# Patient Record
Sex: MALE | Race: WHITE | Employment: FULL TIME | URBAN - METROPOLITAN AREA
[De-identification: names, ages, dates, MRNs, and addresses within clinical notes are randomized per-mention and may not be internally consistent; named-entity substitution may affect disease eponyms.]

---

## 2017-04-10 DIAGNOSIS — E11.40 TYPE 2 DIABETES MELLITUS WITH DIABETIC NEUROPATHY (H): ICD-10-CM

## 2017-04-10 NOTE — PROGRESS NOTES
"  SUBJECTIVE:                                                    Chung Lincoln is a 56 year old male who presents to clinic today for the following health issues:      HPI    Diabetes Follow-up    Patient is checking blood sugars: once daily.  Results are as follows:         120s usually. Doesn't check it that often. If he does check it's usually in the morning before eating     Diabetic concerns: None     Symptoms of hypoglycemia (low blood sugar): Symptoms occur if sugars < 80.     Paresthesias (numbness or burning in feet) or sores: Yes numbness, tingling and pain in both feet      Date of last diabetic eye exam: is due for one now        Has lost 20 pounds since last visit as he is working on a healthier diet and exercise. Numbness and tingling in feet is stable and worse when he is eating poorly.     Problem list and histories reviewed & adjusted, as indicated.  Additional history: none    ROS:  GENERAL: Denies fever, fatigue, weakness, weight gain, or weight loss.  CARDIOVASCULAR: Denies chest pain, shortness of breath, irregular heartbeats,  palpitations, or edema.  RESPIRATORY: Denies cough, hemoptysis, and shortness of breath.  NEUROLOGIC: Denies headache, fainting, dizziness, memory loss, numbness, tingling, or seizures.    OBJECTIVE:                                                    /78 (BP Location: Right arm, Patient Position: Chair, Cuff Size: Adult Large)  Pulse 68  Temp 97.7  F (36.5  C) (Oral)  Resp 18  Ht 5' 8.11\" (1.73 m)  Wt 278 lb 3.2 oz (126.2 kg)  SpO2 97%  BMI 42.16 kg/m2  Body mass index is 42.16 kg/(m^2).  GENERAL: healthy, alert and no distress  RESP: lungs clear to auscultation - no rales, rhonchi or wheezes  CV: regular rate and rhythm, normal S1 S2, no S3 or S4, no murmur, click or rub, no peripheral edema    Diagnostic Test Results:  Lab Results   Component Value Date    A1C 6.2 04/14/2017    A1C 6.4 12/16/2016    A1C 6.9 09/12/2016    A1C 7.9 05/23/2016    A1C 7.4 " 03/25/2009          ASSESSMENT/PLAN:                                                        ICD-10-CM    1. Type 2 diabetes mellitus without complication, without long-term current use of insulin (H) E11.9 Hemoglobin A1c   2. Essential hypertension with goal blood pressure less than 140/90 I10    3. Screen for colon cancer Z12.11        Diabetes is under great control. Continue with your current diet and exercise regimen and follow up in 6 months.     a FIT test.    Toy Martinez PA-C  [facility]

## 2017-04-10 NOTE — TELEPHONE ENCOUNTER
[facility] phone call message- patient requests medication or medication refill:    If this is a refill request, has the caller requested the refill from the pharmacy already? No  Name of the pharmacy and phone number for the current request:  [facility] [phone]    Name of the medication requested: Metformin    Other request: patient will be out today but he has a appt set up for Friday    OK to leave the result message on voice mail or with a family member? NO    Call taken on 4/10/2017 at 11:41 AM by Tanesha Bellamy

## 2017-04-10 NOTE — TELEPHONE ENCOUNTER
Medication is being filled for 1 time refill only due to:  Patient needs to be seen because due for OV.     Josie Tejada, RN, BSN

## 2017-04-10 NOTE — TELEPHONE ENCOUNTER
Metformin          Last Written Prescription Date: 9/12/16  Last Fill Quantity: 180, # refills: 1  Last Office Visit with [facility], [facility] or [facility] prescribing provider:  9/12/16   Next 5 appointments (look out 90 days)     Apr 14, 2017  4:15 PM CDT   Office Visit with Toy Martinez PA-C   [facility]    [address]  [facility] [id number] [phone]                   BP Readings from Last 3 Encounters:   12/16/16 126/78   09/12/16 136/90   06/02/16 (!) 152/98     Lab Results   Component Value Date    MICROL 7 09/12/2016     Lab Results   Component Value Date    UMALCR 6.14 09/12/2016     Creatinine   Date Value Ref Range Status   09/12/2016 0.87 0.66 - 1.25 mg/dL Final   ]  GFR Estimate   Date Value Ref Range Status   09/12/2016 >90  Non  GFR Calc   >60 mL/min/1.7m2 Final   04/21/2016 76 >60 mL/min/1.7m2 Final     Comment:     Non  GFR Calc   03/25/2009 >90 >60 mL/min/1.7m2 Final     GFR Estimate If Black   Date Value Ref Range Status   09/12/2016 >90   GFR Calc   >60 mL/min/1.7m2 Final   04/21/2016 >90   GFR Calc   >60 mL/min/1.7m2 Final   03/25/2009 >90 >60 mL/min/1.7m2 Final     Lab Results   Component Value Date    CHOL 124 12/16/2016     Lab Results   Component Value Date    HDL 32 12/16/2016     Lab Results   Component Value Date    LDL 63 12/16/2016     Lab Results   Component Value Date    TRIG 145 12/16/2016     Lab Results   Component Value Date    CHOLHDLRATIO 7.0 03/25/2009     Lab Results   Component Value Date    AST 12 12/16/2016     Lab Results   Component Value Date    ALT 19 12/16/2016     Lab Results   Component Value Date    A1C 6.4 12/16/2016    A1C 6.9 09/12/2016    A1C 7.9 05/23/2016    A1C 7.4 03/25/2009    A1C 6.6 08/31/2006     Potassium   Date Value Ref Range Status   09/12/2016 3.9 3.4 - 5.3 mmol/L Final

## 2017-04-11 DIAGNOSIS — E11.40 TYPE 2 DIABETES MELLITUS WITH DIABETIC NEUROPATHY (H): ICD-10-CM

## 2017-04-14 ENCOUNTER — OFFICE VISIT (OUTPATIENT)
Dept: FAMILY MEDICINE | Facility: OTHER | Age: 57
End: 2017-04-14
Payer: COMMERCIAL

## 2017-04-14 VITALS
TEMPERATURE: 97.7 F | HEART RATE: 68 BPM | BODY MASS INDEX: 42.16 KG/M2 | RESPIRATION RATE: 18 BRPM | OXYGEN SATURATION: 97 % | HEIGHT: 68 IN | SYSTOLIC BLOOD PRESSURE: 136 MMHG | DIASTOLIC BLOOD PRESSURE: 78 MMHG | WEIGHT: 278.2 LBS

## 2017-04-14 DIAGNOSIS — G47.33 OSA (OBSTRUCTIVE SLEEP APNEA): ICD-10-CM

## 2017-04-14 DIAGNOSIS — Z12.11 SCREEN FOR COLON CANCER: ICD-10-CM

## 2017-04-14 DIAGNOSIS — E11.9 TYPE 2 DIABETES MELLITUS WITHOUT COMPLICATION, WITHOUT LONG-TERM CURRENT USE OF INSULIN (H): Primary | ICD-10-CM

## 2017-04-14 DIAGNOSIS — I10 ESSENTIAL HYPERTENSION WITH GOAL BLOOD PRESSURE LESS THAN 140/90: ICD-10-CM

## 2017-04-14 LAB — HBA1C MFR BLD: 6.2 % (ref 4.3–6)

## 2017-04-14 PROCEDURE — 36415 COLL VENOUS BLD VENIPUNCTURE: CPT | Performed by: PHYSICIAN ASSISTANT

## 2017-04-14 PROCEDURE — 83036 HEMOGLOBIN GLYCOSYLATED A1C: CPT | Performed by: PHYSICIAN ASSISTANT

## 2017-04-14 PROCEDURE — 99213 OFFICE O/P EST LOW 20 MIN: CPT | Performed by: PHYSICIAN ASSISTANT

## 2017-04-14 RX ORDER — LISINOPRIL 20 MG/1
20 TABLET ORAL DAILY
Qty: 90 TABLET | Refills: 3 | Status: SHIPPED | OUTPATIENT
Start: 2017-04-14 | End: 2018-04-30

## 2017-04-14 ASSESSMENT — PAIN SCALES - GENERAL: PAINLEVEL: NO PAIN (0)

## 2017-04-14 NOTE — PATIENT INSTRUCTIONS
Continue with current diet and exercise regimen and follow up in 6 months.     the FIT test next week.

## 2017-04-14 NOTE — MR AVS SNAPSHOT
After Visit Summary   4/14/2017    Chung Lincoln    MRN: [id number]           Patient Information     Date Of Birth          1960        Visit Information        Provider Department      4/14/2017 4:15 PM Toy Martinez PA-C [facility]        Today's Diagnoses     Type 2 diabetes mellitus without complication, without long-term current use of insulin (H)    -  1    Essential hypertension with goal blood pressure less than 140/90        STEVEN (obstructive sleep apnea)        Screen for colon cancer          Care Instructions    Continue with current diet and exercise regimen and follow up in 6 months.     the FIT test next week.            Follow-ups after your visit        Additional Services     OTOLARYNGOLOGY REFERRAL       Your provider has referred you to: [facility]: [facility] [phone]   [email or web address]    Discuss CPAP setting for sleep apnea    Please be aware that coverage of these services is subject to the terms and limitations of your health insurance plan.  Call member services at your health plan with any benefit or coverage questions.      Please bring the following with you to your appointment:    (1) Any X-Rays, CTs or MRIs which have been performed.  Contact the facility where they were done to arrange for  prior to your scheduled appointment.   (2) List of current medications  (3) This referral request   (4) Any documents/labs given to you for this referral                  Follow-up notes from your care team     Return in about 6 months (around 10/14/2017) for Routine Visit.      Future tests that were ordered for you today     Open Future Orders        Priority Expected Expires Ordered    **A1C FUTURE 6mo Routine 10/11/2017 11/10/2017 4/14/2017    Fecal colorectal cancer screen (FIT) Routine 5/5/2017 7/7/2017 4/14/2017            Who to contact     If you have questions or need follow up  "information about today's clinic visit or your schedule please contact [facility] [place] directly at [phone].  Normal or non-critical lab and imaging results will be communicated to you by MyChart, letter or phone within 4 business days after the clinic has received the results. If you do not hear from us within 7 days, please contact the clinic through MyChart or phone. If you have a critical or abnormal lab result, we will notify you by phone as soon as possible.  Submit refill requests through [employer] or call your pharmacy and they will forward the refill request to us. Please allow 3 business days for your refill to be completed.          Additional Information About Your Visit        MyChart Information     [employer] lets you send messages to your doctor, view your test results, renew your prescriptions, schedule appointments and more. To sign up, go to [email or web address] . Click on \"Log in\" on the left side of the screen, which will take you to the Welcome page. Then click on \"Sign up Now\" on the right side of the page.     You will be asked to enter the access code listed below, as well as some personal information. Please follow the directions to create your username and password.     Your access code is: [email or web address]  Expires: 2017  5:18 PM     Your access code will  in 90 days. If you need help or a new code, please call your [place] clinic or [phone].        Care EveryWhere ID     This is your Care EveryWhere ID. This could be used by other organizations to access your [place] medical records  [id number]        Your Vitals Were     Pulse Temperature Respirations Height Pulse Oximetry BMI (Body Mass Index)    68 97.7  F (36.5  C) (Oral) 18 5' 8.11\" (1.73 m) 97% 42.16 kg/m2       Blood Pressure from Last 3 Encounters:   17 136/78   16 126/78   16 136/90    Weight from Last 3 Encounters:   17 278 lb 3.2 oz (126.2 kg)   16 297 lb 3.2 oz (134.8 " kg)   06/02/16 (!) 305 lb (138.3 kg)              We Performed the Following     Hemoglobin A1c     OTOLARYNGOLOGY REFERRAL          Today's Medication Changes          These changes are accurate as of: 4/14/17  5:20 PM.  If you have any questions, ask your nurse or doctor.               These medicines have changed or have updated prescriptions.        Dose/Directions    lisinopril 20 MG tablet   Commonly known as:  PRINIVIL/ZESTRIL   This may have changed:  Another medication with the same name was removed. Continue taking this medication, and follow the directions you see here.   Used for:  Type 2 diabetes mellitus without complication, without long-term current use of insulin (H)   Changed by:  Toy Martinez PA-C        Dose:  20 mg   Take 1 tablet (20 mg) by mouth daily   Quantity:  90 tablet   Refills:  3            Where to get your medicines      These medications were sent to [address]  [address], [facility] [address]     Phone:  [phone]     lisinopril 20 MG tablet    metFORMIN 500 MG tablet                Primary Care Provider Office Phone # Fax #    Toy Martinez PA-C [phone]       [facility] [address]  [facility] [address]        Thank you!     Thank you for choosing [facility]  for your care. Our goal is always to provide you with excellent care. Hearing back from our patients is one way we can continue to improve our services. Please take a few minutes to complete the written survey that you may receive in the mail after your visit with us. Thank you!             Your Updated Medication List - Protect others around you: Learn how to safely use, store and throw away your medicines at www.disposemymeds.org.          This list is accurate as of: 4/14/17  5:20 PM.  Always use your most recent med list.                   Brand Name Dispense Instructions for use    aspirin 81 MG tablet       Take by mouth daily       blood glucose lancets standard    no brand specified    1 Box    Use to test blood sugar 1 times daily or as directed.       blood glucose monitoring meter device kit    no brand specified    1 kit    Use to test blood sugar 1 times daily or as directed.       blood glucose monitoring test strip    no brand specified    100 each    Use to test blood sugars 1 times daily or as directed       lisinopril 20 MG tablet    PRINIVIL/ZESTRIL    90 tablet    Take 1 tablet (20 mg) by mouth daily       metFORMIN 500 MG tablet    GLUCOPHAGE    180 tablet    Take 1 tablet (500 mg) by mouth 2 times daily (with meals)       order for DME     1    needs new CPAP machine       PRILOSEC OTC PO          simvastatin 10 MG tablet    ZOCOR    45 tablet    Take 0.5 tablets (5 mg) by mouth At Bedtime

## 2017-04-14 NOTE — NURSING NOTE
"Chief Complaint   Patient presents with     Diabetes     Panel Management     arielle Barron colono, honoring jereym, eye exam       Initial /78 (BP Location: Right arm, Patient Position: Chair, Cuff Size: Adult Large)  Pulse 68  Temp 97.7  F (36.5  C) (Oral)  Resp 18  Ht 5' 8.11\" (1.73 m)  Wt 278 lb 3.2 oz (126.2 kg)  SpO2 97%  BMI 42.16 kg/m2 Estimated body mass index is 42.16 kg/(m^2) as calculated from the following:    Height as of this encounter: 5' 8.11\" (1.73 m).    Weight as of this encounter: 278 lb 3.2 oz (126.2 kg).  Medication Reconciliation: complete      "

## 2017-04-18 DIAGNOSIS — E11.40 TYPE 2 DIABETES MELLITUS WITH DIABETIC NEUROPATHY (H): ICD-10-CM

## 2017-04-18 NOTE — TELEPHONE ENCOUNTER
Lisinopril (Prinivil/Zestril) 20 MG      Last Written Prescription Date: 4/14/17  Last Fill Quantity: 90, # refills: 3  Last Office Visit with G, P or [facility] prescribing provider: 4/14/17       Potassium   Date Value Ref Range Status   09/12/2016 3.9 3.4 - 5.3 mmol/L Final     Creatinine   Date Value Ref Range Status   09/12/2016 0.87 0.66 - 1.25 mg/dL Final     BP Readings from Last 3 Encounters:   04/14/17 136/78   12/16/16 126/78   09/12/16 136/90

## 2017-04-19 RX ORDER — LISINOPRIL 20 MG/1
TABLET ORAL
Qty: 90 TABLET | Refills: 1 | OUTPATIENT
Start: 2017-04-19

## 2017-05-17 ENCOUNTER — TELEPHONE (OUTPATIENT)
Dept: FAMILY MEDICINE | Facility: OTHER | Age: 57
End: 2017-05-17

## 2017-05-17 NOTE — TELEPHONE ENCOUNTER
Summary:    Patient is due/failing the following:   FIT Test     Action needed:   Complete a FIT Test     Type of outreach:    Phone, left message for patient to call back.     Questions for provider review:    None                                                                                                                                    Jeane Hannafco       Chart routed to Care Team .        Panel Management Review      Patient has the following on his problem list:     Diabetes    ASA: Passed    Last A1C  Lab Results   Component Value Date    A1C 6.2 04/14/2017    A1C 6.4 12/16/2016    A1C 6.9 09/12/2016    A1C 7.9 05/23/2016    A1C 7.4 03/25/2009     A1C tested: Passed    Last LDL:    Lab Results   Component Value Date    CHOL 124 12/16/2016     Lab Results   Component Value Date    HDL 32 12/16/2016     Lab Results   Component Value Date    LDL 63 12/16/2016     Lab Results   Component Value Date    TRIG 145 12/16/2016     Lab Results   Component Value Date    CHOLHDLRATIO 7.0 03/25/2009     Lab Results   Component Value Date    NHDL 92 12/16/2016       Is the patient on a Statin? YES             Is the patient on Aspirin? YES    Medications     HMG CoA Reductase Inhibitors    simvastatin (ZOCOR) 10 MG tablet    Salicylates    aspirin 81 MG tablet          Last three blood pressure readings:  BP Readings from Last 3 Encounters:   04/14/17 136/78   12/16/16 126/78   09/12/16 136/90            Tobacco History:     History   Smoking Status     Never Smoker   Smokeless Tobacco     Not on file         Hypertension   Last three blood pressure readings:  BP Readings from Last 3 Encounters:   04/14/17 136/78   12/16/16 126/78   09/12/16 136/90     Blood pressure: Passed    HTN Guidelines:  Age 18-59 BP range:  Less than 140/90  Age 60-85 with Diabetes:  Less than 140/90  Age 60-85 without Diabetes:  less than 150/90      Composite cancer screening  Chart review shows that this patient is due/due soon for the  following Fecal Colorectal (FIT)

## 2017-07-24 DIAGNOSIS — E11.40 TYPE 2 DIABETES MELLITUS WITH DIABETIC NEUROPATHY (H): ICD-10-CM

## 2017-07-25 NOTE — TELEPHONE ENCOUNTER
lisinopril (PRINIVIL/ZESTRIL) 20 MG tablet      Last Written Prescription Date: 4/14/17  Last Fill Quantity: 90, # refills: 3  Last Office Visit with G, P or [facility] prescribing provider: 4/14/17       Potassium   Date Value Ref Range Status   09/12/2016 3.9 3.4 - 5.3 mmol/L Final     Creatinine   Date Value Ref Range Status   09/12/2016 0.87 0.66 - 1.25 mg/dL Final     BP Readings from Last 3 Encounters:   04/14/17 136/78   12/16/16 126/78   09/12/16 136/90

## 2017-07-27 RX ORDER — LISINOPRIL 20 MG/1
TABLET ORAL
Qty: 90 TABLET | Refills: 1 | OUTPATIENT
Start: 2017-07-27

## 2017-07-27 NOTE — TELEPHONE ENCOUNTER
Script on file at [facility] in [place],  [place] is requesting. Notes sent to pharmacy to transfer script.     Josie Tejada RN, BSN

## 2017-08-07 ENCOUNTER — TRANSFERRED RECORDS (OUTPATIENT)
Dept: HEALTH INFORMATION MANAGEMENT | Facility: CLINIC | Age: 57
End: 2017-08-07

## 2017-09-19 ENCOUNTER — TELEPHONE (OUTPATIENT)
Dept: FAMILY MEDICINE | Facility: OTHER | Age: 57
End: 2017-09-19

## 2017-09-19 DIAGNOSIS — E11.9 TYPE 2 DIABETES MELLITUS WITHOUT COMPLICATION, WITHOUT LONG-TERM CURRENT USE OF INSULIN (H): Primary | ICD-10-CM

## 2017-09-19 NOTE — TELEPHONE ENCOUNTER
Summary:    Patient is due/failing the following:   Foot exam, Creatinine, Micoralbumin, A1C, FOLLOW UP and FIT    Action needed:   Patient needs office visit for diabetic follow up. and Patient needs non-fasting lab only appointment. Complete a FIT test     Type of outreach:    Phone, left message for patient to call back.     Questions for provider review:    None                                                                                                                                    Jeane Locke       Chart routed to Care Team .      Panel Management Review      Patient has the following on his problem list:     Diabetes    ASA: Passed    Last A1C  Lab Results   Component Value Date    A1C 6.2 04/14/2017    A1C 6.4 12/16/2016    A1C 6.9 09/12/2016    A1C 7.9 05/23/2016    A1C 7.4 03/25/2009     A1C tested: Passed    Last LDL:    Lab Results   Component Value Date    CHOL 124 12/16/2016     Lab Results   Component Value Date    HDL 32 12/16/2016     Lab Results   Component Value Date    LDL 63 12/16/2016     Lab Results   Component Value Date    TRIG 145 12/16/2016     Lab Results   Component Value Date    CHOLHDLRATIO 7.0 03/25/2009     Lab Results   Component Value Date    NHDL 92 12/16/2016       Is the patient on a Statin? YES             Is the patient on Aspirin? YES    Medications     HMG CoA Reductase Inhibitors    simvastatin (ZOCOR) 10 MG tablet    Salicylates    aspirin 81 MG tablet          Last three blood pressure readings:  BP Readings from Last 3 Encounters:   04/14/17 136/78   12/16/16 126/78   09/12/16 136/90        Tobacco History:     History   Smoking Status     Never Smoker   Smokeless Tobacco     Not on file         Hypertension   Last three blood pressure readings:  BP Readings from Last 3 Encounters:   04/14/17 136/78   12/16/16 126/78   09/12/16 136/90     Blood pressure: Passed    HTN Guidelines:  Age 18-59 BP range:  Less than 140/90  Age 60-85 with Diabetes:  Less than  140/90  Age 60-85 without Diabetes:  less than 150/90          Composite cancer screening  Chart review shows that this patient is due/due soon for the following Fecal Colorectal (FIT)

## 2017-09-19 NOTE — LETTER
[address]   [facility] [address]  Phone: [phone]  September 27, 2017      Chung Lincoln  [address] [address] [place] [id number]      Dear Chung,    We care about your health and have reviewed your health plan including your medical conditions, medications, and lab results.  Based on this review, it is recommended that you follow up regarding the following health topic(s):  -Diabetes  -Colon Cancer Screening    We recommend you take the following action(s):  -schedule a FOLLOWUP OFFICE APPOINTMENT.  We will perform the following labs:  A1c, Microablumin and Creatinine.  -schedule a COLONOSCOPY to look for colon cancer (due every 10 years or 5 years in higher risk situations.)  Colonoscopies can prevent 90-95% of colon cancer deaths.  Problem lesions can be removed before they ever become cancer.  If you do not wish to do a colonoscopy or cannot afford to do one at this time, there is another option called a Fecal Immunochemical Occult Blood Test (FIT) a take home stool sample kit.  It does not replace the colonoscopy for colorectal cancer screening, but it can detect hidden bleeding in the lower colon.  It does need to be repeated every year and if a positive result is obtained, you would be referred for a colonoscopy.  If you have completed either one of these tests at another facility, please have the records sent to our clinic for our records.     Please call us at the [facility] - [phone] (or use [employer]) to address the above recommendations.     Thank you for trusting [facility] and we appreciate the opportunity to serve you.  We look forward to supporting your healthcare needs in the future.    Healthy Regards,    Your Health Care Team  [facility]

## 2017-10-07 ENCOUNTER — TELEPHONE (OUTPATIENT)
Dept: FAMILY MEDICINE | Facility: OTHER | Age: 57
End: 2017-10-07

## 2017-10-07 DIAGNOSIS — E11.9 TYPE 2 DIABETES MELLITUS WITHOUT COMPLICATION, WITHOUT LONG-TERM CURRENT USE OF INSULIN (H): ICD-10-CM

## 2017-10-07 NOTE — LETTER
[address]  [facility] [address]  Phone: [phone]  October 12, 2017      Chung Lincoln  [address] [address] [place] [phone]      Dear Chung,    We have tried to reach you by phone and have been unsuccessful. This letter is to inform you that we received a refill request for your Metformin. That has been refilled for one time only. It looks like you are due for a diabetes office visit with labs. Please call the clinic at [phone] to schedule an appointment before your prescription runs out.    Thank you,  [place] Care Team

## 2017-10-09 NOTE — TELEPHONE ENCOUNTER
metFORMIN (GLUCOPHAGE) 500 MG tablet         Last Written Prescription Date: 04/14/17  Last Fill Quantity: 180, # refills: 1  Last Office Visit with G, P or [facility] prescribing provider:  04/14/17        BP Readings from Last 3 Encounters:   04/14/17 136/78   12/16/16 126/78   09/12/16 136/90     Lab Results   Component Value Date    MICROL 7 09/12/2016     Lab Results   Component Value Date    UMALCR 6.14 09/12/2016     Creatinine   Date Value Ref Range Status   09/12/2016 0.87 0.66 - 1.25 mg/dL Final   ]  GFR Estimate   Date Value Ref Range Status   09/12/2016 >90  Non  GFR Calc   >60 mL/min/1.7m2 Final   04/21/2016 76 >60 mL/min/1.7m2 Final     Comment:     Non  GFR Calc   03/25/2009 >90 >60 mL/min/1.7m2 Final     GFR Estimate If Black   Date Value Ref Range Status   09/12/2016 >90   GFR Calc   >60 mL/min/1.7m2 Final   04/21/2016 >90   GFR Calc   >60 mL/min/1.7m2 Final   03/25/2009 >90 >60 mL/min/1.7m2 Final     Lab Results   Component Value Date    CHOL 124 12/16/2016     Lab Results   Component Value Date    HDL 32 12/16/2016     Lab Results   Component Value Date    LDL 63 12/16/2016     Lab Results   Component Value Date    TRIG 145 12/16/2016     Lab Results   Component Value Date    CHOLHDLRATIO 7.0 03/25/2009     Lab Results   Component Value Date    AST 12 12/16/2016     Lab Results   Component Value Date    ALT 19 12/16/2016     Lab Results   Component Value Date    A1C 6.2 04/14/2017    A1C 6.4 12/16/2016    A1C 6.9 09/12/2016    A1C 7.9 05/23/2016    A1C 7.4 03/25/2009     Potassium   Date Value Ref Range Status   09/12/2016 3.9 3.4 - 5.3 mmol/L Final

## 2017-10-10 NOTE — TELEPHONE ENCOUNTER
LM for patient to return phone call to clinic about message below.  Emily Roblero CMA ([facility])

## 2017-10-10 NOTE — TELEPHONE ENCOUNTER
metFORMIN (GLUCOPHAGE) 500 MG tablet  Medication is being filled for 1 time refill only due to:  Patient needs to be seen because due for 6 month follow up with lab work.     Please assist with scheduling.    Shayy Lagunas RN, BSN

## 2017-10-11 NOTE — TELEPHONE ENCOUNTER
Left message for patient to call back. Please see message below and help schedule OV.  Hilaria Abreu, CMA

## 2017-10-12 NOTE — TELEPHONE ENCOUNTER
Left message for patient to call back. Please see message below. 3rd attempt, letter sent.  Hilaria Abreu CMA

## 2017-11-03 NOTE — PROGRESS NOTES
"  SUBJECTIVE:                                                    Chung Lincoln is a 57 year old male who presents to clinic today for the following health issues:      History of Present Illness     Diabetes:     Frequency of checking blood sugars::  Rarely    Diabetic concerns::  None    Hypoglycemia symptoms::  Weak    Paraesthesia present::  YES    Eye Exam in the last year::  Yes    Sept 30    Diet:  Diabetic  Frequency of exercise:  None  Taking medications regularly:  Yes  Medication side effects:  None  Additional concerns today:  No      His diet has been much worse since his last visit with increased portion sizes and carbs. He does not get any regular exercise. He describes occasional numbness and tingling in his feet but denies any worsening symptoms.     Answers for HPI/ROS submitted by the patient on 11/8/2017   PHQ-2 Score: 0    Problem list and histories reviewed & adjusted, as indicated.  Additional history: none    ROS:  GENERAL: Denies fever, fatigue, weakness, weight gain, or weight loss.  CARDIOVASCULAR: Denies chest pain, shortness of breath, irregular heartbeats,  palpitations, or edema.  RESPIRATORY: Denies cough, hemoptysis, and shortness of breath.  NEUROLOGIC: +Intermittent numbness and tingling in feet. Denies headache, fainting, dizziness, memory loss, or seizures.  PSYCHIATRIC: Denies depression, anxiety, mood swings, and thoughts of suicide.    OBJECTIVE:     /82 (BP Location: Right arm, Patient Position: Chair, Cuff Size: Adult Regular)  Pulse 78  Temp 97  F (36.1  C) (Temporal)  Resp 20  Ht 5' 8.11\" (1.73 m)  Wt (!) 318 lb (144.2 kg)  SpO2 98%  BMI 48.2 kg/m2  Body mass index is 48.2 kg/(m^2).  GENERAL: healthy, alert and no distress  RESP: lungs clear to auscultation - no rales, rhonchi or wheezes  CV: regular rate and rhythm, normal S1 S2, no S3 or S4, no murmur, click or rub, no peripheral edema and peripheral pulses strong  PSYCH: mentation appears normal, affect " normal/bright  Diabetic foot exam: normal DP and PT pulses, no trophic changes or ulcerative lesions, normal sensory exam and normal monofilament exam, except for decreased sensation on the right at areas 4 and 10.               Diagnostic Test Results:  Lab Results   Component Value Date    A1C 7.8 11/08/2017    A1C 6.2 04/14/2017    A1C 6.4 12/16/2016    A1C 6.9 09/12/2016    A1C 7.9 05/23/2016         ASSESSMENT/PLAN:       ICD-10-CM    1. Type 2 diabetes mellitus with diabetic neuropathy, without long-term current use of insulin (H) E11.40 metFORMIN (GLUCOPHAGE) 500 MG tablet   2. Essential hypertension with goal blood pressure less than 140/90 I10    3. Hyperlipidemia LDL goal <100 E78.5 simvastatin (ZOCOR) 10 MG tablet   4. STEVEN (obstructive sleep apnea) G47.33    5. Morbid obesity (H) E66.01    6. Need for prophylactic vaccination and inoculation against influenza Z23 FLU VAC, SPLIT VIRUS IM > 3 YO (QUADRIVALENT) [87605]     Vaccine Administration, Initial [62918]     Lipid panel reflex to direct LDL Fasting   7. Screen for colon cancer Z12.11 Fecal colorectal cancer screen (FIT)         1, 5. He admits to having fallen off the wagon since his last visit with a very poor diet and lack of exercise. He has gained 40 pounds in 6 months and his A1c has worsened to 7.8. He states he knows that changes he needs to make and knows he can lose the weight again. He will work on a healthier diet with limited carbs and decreased portion sizes along with increased exercise. I will increase his metformin to 1000 mg twice daily and will plan on repeat A1c and follow up in 3 months.     2. Stable, continue lisinopril.    3. He would rather take a full tablet compared to half so will increase to a full 10 mg tablet of simvastatin. He will complete an updated fasting lipid panel at his convenience.    4. Stable.    6. Flu vaccine administered.    7. FIT testing ordered.       Toy Martinez PA-C  [facility]  [place]

## 2017-11-08 ENCOUNTER — OFFICE VISIT (OUTPATIENT)
Dept: FAMILY MEDICINE | Facility: OTHER | Age: 57
End: 2017-11-08
Payer: COMMERCIAL

## 2017-11-08 VITALS
RESPIRATION RATE: 20 BRPM | SYSTOLIC BLOOD PRESSURE: 134 MMHG | HEIGHT: 68 IN | OXYGEN SATURATION: 98 % | TEMPERATURE: 97 F | HEART RATE: 78 BPM | BODY MASS INDEX: 47.74 KG/M2 | WEIGHT: 315 LBS | DIASTOLIC BLOOD PRESSURE: 82 MMHG

## 2017-11-08 DIAGNOSIS — Z23 NEED FOR PROPHYLACTIC VACCINATION AND INOCULATION AGAINST INFLUENZA: ICD-10-CM

## 2017-11-08 DIAGNOSIS — E11.40 TYPE 2 DIABETES MELLITUS WITH DIABETIC NEUROPATHY, WITHOUT LONG-TERM CURRENT USE OF INSULIN (H): Primary | ICD-10-CM

## 2017-11-08 DIAGNOSIS — E78.5 HYPERLIPIDEMIA LDL GOAL <100: ICD-10-CM

## 2017-11-08 DIAGNOSIS — Z12.11 SCREEN FOR COLON CANCER: ICD-10-CM

## 2017-11-08 DIAGNOSIS — E66.01 MORBID OBESITY (H): ICD-10-CM

## 2017-11-08 DIAGNOSIS — I10 ESSENTIAL HYPERTENSION WITH GOAL BLOOD PRESSURE LESS THAN 140/90: ICD-10-CM

## 2017-11-08 DIAGNOSIS — G47.33 OSA (OBSTRUCTIVE SLEEP APNEA): ICD-10-CM

## 2017-11-08 LAB
ANION GAP SERPL CALCULATED.3IONS-SCNC: 8 MMOL/L (ref 3–14)
BUN SERPL-MCNC: 19 MG/DL (ref 7–30)
CALCIUM SERPL-MCNC: 9 MG/DL (ref 8.5–10.1)
CHLORIDE SERPL-SCNC: 101 MMOL/L (ref 94–109)
CO2 SERPL-SCNC: 30 MMOL/L (ref 20–32)
CREAT SERPL-MCNC: 0.88 MG/DL (ref 0.66–1.25)
CREAT UR-MCNC: 140 MG/DL
GFR SERPL CREATININE-BSD FRML MDRD: 89 ML/MIN/1.7M2
GLUCOSE SERPL-MCNC: 251 MG/DL (ref 70–99)
HBA1C MFR BLD: 7.8 % (ref 4.3–6)
MICROALBUMIN UR-MCNC: 11 MG/L
MICROALBUMIN/CREAT UR: 7.57 MG/G CR (ref 0–17)
POTASSIUM SERPL-SCNC: 4 MMOL/L (ref 3.4–5.3)
SODIUM SERPL-SCNC: 139 MMOL/L (ref 133–144)

## 2017-11-08 PROCEDURE — 90471 IMMUNIZATION ADMIN: CPT | Performed by: PHYSICIAN ASSISTANT

## 2017-11-08 PROCEDURE — 80048 BASIC METABOLIC PNL TOTAL CA: CPT | Performed by: PHYSICIAN ASSISTANT

## 2017-11-08 PROCEDURE — 83036 HEMOGLOBIN GLYCOSYLATED A1C: CPT | Performed by: PHYSICIAN ASSISTANT

## 2017-11-08 PROCEDURE — 82043 UR ALBUMIN QUANTITATIVE: CPT | Performed by: PHYSICIAN ASSISTANT

## 2017-11-08 PROCEDURE — 99214 OFFICE O/P EST MOD 30 MIN: CPT | Performed by: PHYSICIAN ASSISTANT

## 2017-11-08 PROCEDURE — 90686 IIV4 VACC NO PRSV 0.5 ML IM: CPT | Performed by: PHYSICIAN ASSISTANT

## 2017-11-08 PROCEDURE — 99207 C FOOT EXAM  NO CHARGE: CPT | Mod: 25 | Performed by: PHYSICIAN ASSISTANT

## 2017-11-08 PROCEDURE — 36415 COLL VENOUS BLD VENIPUNCTURE: CPT | Performed by: PHYSICIAN ASSISTANT

## 2017-11-08 RX ORDER — SIMVASTATIN 10 MG
10 TABLET ORAL AT BEDTIME
Qty: 90 TABLET | Refills: 3 | Status: SHIPPED | OUTPATIENT
Start: 2017-11-08 | End: 2018-11-26

## 2017-11-08 NOTE — MR AVS SNAPSHOT
After Visit Summary   11/8/2017    Chung Lincoln    MRN: [id number]           Patient Information     Date Of Birth          1960        Visit Information        Provider Department      11/8/2017 4:00 PM Toy Martinez PA-C [facility]        Today's Diagnoses     Type 2 diabetes mellitus with diabetic neuropathy, without long-term current use of insulin (H)    -  1    Essential hypertension with goal blood pressure less than 140/90        Hyperlipidemia LDL goal <100        STEVEN (obstructive sleep apnea)        Need for prophylactic vaccination and inoculation against influenza        Screen for colon cancer          Care Instructions    Will increase metformin to 1000 mg twice daily to better control your diabetes.  Start testing your glucose more frequently, 1-2 times daily, once in the morning and once either before dinner or 2 hours after dinner. Goal in the morning is less than 120 and 2 hours after dinner less than 180.    Will change dose of simvastatin to 10 mg daily.    Work on a healthier diet with less carbs and portion sizes. Try to exercise more frequently.    Complete a fasting lipid panel in the near future.    Follow up in 3 months.              Follow-ups after your visit        Follow-up notes from your care team     Return in about 3 months (around 2/8/2018) for Routine Visit, Lab Work.      Future tests that were ordered for you today     Open Future Orders        Priority Expected Expires Ordered    Fecal colorectal cancer screen (FIT) Routine 11/29/2017 1/31/2018 11/8/2017    Lipid panel reflex to direct LDL Fasting Routine 11/15/2017 12/18/2017 11/8/2017            Who to contact     If you have questions or need follow up information about today's clinic visit or your schedule please contact [facility] directly at [phone].  Normal or non-critical lab and imaging results will be communicated to you by MyChart, letter or phone  "within 4 business days after the clinic has received the results. If you do not hear from us within 7 days, please contact the clinic through [employer] or phone. If you have a critical or abnormal lab result, we will notify you by phone as soon as possible.  Submit refill requests through [employer] or call your pharmacy and they will forward the refill request to us. Please allow 3 business days for your refill to be completed.          Additional Information About Your Visit        [employer] Information     [employer] lets you send messages to your doctor, view your test results, renew your prescriptions, schedule appointments and more. To sign up, go to [email or web address] . Click on \"Log in\" on the left side of the screen, which will take you to the Welcome page. Then click on \"Sign up Now\" on the right side of the page.     You will be asked to enter the access code listed below, as well as some personal information. Please follow the directions to create your username and password.     Your access code is: [email or web address]  Expires: 2018  4:31 PM     Your access code will  in 90 days. If you need help or a new code, please call your [facility] clinic or [phone].        Care EveryWhere ID     This is your Care EveryWhere ID. This could be used by other organizations to access your [facility] medical records  [id number]        Your Vitals Were     Pulse Temperature Respirations Height Pulse Oximetry BMI (Body Mass Index)    78 97  F (36.1  C) (Temporal) 20 5' 8.11\" (1.73 m) 98% 48.2 kg/m2       Blood Pressure from Last 3 Encounters:   17 134/82   17 136/78   16 126/78    Weight from Last 3 Encounters:   17 (!) 318 lb (144.2 kg)   17 278 lb 3.2 oz (126.2 kg)   16 297 lb 3.2 oz (134.8 kg)              We Performed the Following     Albumin Random Urine Quantitative with Creat Ratio     Basic metabolic panel  (Ca, Cl, CO2, Creat, Gluc, K, Na, BUN)     FLU VAC, SPLIT VIRUS IM > " 3 YO (QUADRIVALENT) [03002]     FOOT EXAM  NO CHARGE [20883.114]     Hemoglobin A1c     Vaccine Administration, Initial [27859]          Today's Medication Changes          These changes are accurate as of: 11/8/17  4:31 PM.  If you have any questions, ask your nurse or doctor.               These medicines have changed or have updated prescriptions.        Dose/Directions    metFORMIN 500 MG tablet   Commonly known as:  GLUCOPHAGE   This may have changed:  See the new instructions.   Changed by:  Toy Martinez PA-C        Dose:  1000 mg   Take 2 tablets (1,000 mg) by mouth 2 times daily (with meals)   Quantity:  360 tablet   Refills:  1       simvastatin 10 MG tablet   Commonly known as:  ZOCOR   This may have changed:  how much to take   Used for:  Type 2 diabetes mellitus with diabetic neuropathy, without long-term current use of insulin (H)   Changed by:  Toy Martinez PA-C        Dose:  10 mg   Take 1 tablet (10 mg) by mouth At Bedtime   Quantity:  90 tablet   Refills:  3            Where to get your medicines      These medications were sent to [address]  [address] [facility] [address]     Phone:  [phone]     metFORMIN 500 MG tablet    simvastatin 10 MG tablet                Primary Care Provider Office Phone # Fax #    Toy Martinez PA-C [phone]       [address] [place] [address]        Equal Access to Services     NICK SANDHU : Hadii adelaide harp hadasho Soloali, waaxda luqadaha, qaybta kaalmada cosme, edmund johnson. So [facility] [phone].    ATENCIÓN: Si habla español, tiene a villegas disposición servicios gratuitos de asistencia lingüística. J[place] al [phone].    We comply with applicable federal civil rights laws and [place] laws. We do not discriminate on the basis of race, color, national origin, age, disability, sex, sexual orientation, or gender identity.            Thank  you!     Thank you for choosing [facility]  for your care. Our goal is always to provide you with excellent care. Hearing back from our patients is one way we can continue to improve our services. Please take a few minutes to complete the written survey that you may receive in the mail after your visit with us. Thank you!             Your Updated Medication List - Protect others around you: Learn how to safely use, store and throw away your medicines at www.disposemymeds.org.          This list is accurate as of: 11/8/17  4:31 PM.  Always use your most recent med list.                   Brand Name Dispense Instructions for use Diagnosis    aspirin 81 MG tablet      Take by mouth daily        blood glucose lancets standard    no brand specified    1 Box    Use to test blood sugar 1 times daily or as directed.    Type 2 diabetes mellitus without complication (H)       blood glucose monitoring meter device kit    no brand specified    1 kit    Use to test blood sugar 1 times daily or as directed.    Type 2 diabetes mellitus without complication (H)       blood glucose monitoring test strip    no brand specified    100 each    Use to test blood sugars 1 times daily or as directed    Type 2 diabetes mellitus without complication (H)       lisinopril 20 MG tablet    PRINIVIL/ZESTRIL    90 tablet    Take 1 tablet (20 mg) by mouth daily    Type 2 diabetes mellitus without complication, without long-term current use of insulin (H)       metFORMIN 500 MG tablet    GLUCOPHAGE    360 tablet    Take 2 tablets (1,000 mg) by mouth 2 times daily (with meals)        order for DME     1    needs new CPAP machine    Hypersomnia with sleep apnea, unspecified       PRILOSEC OTC PO           simvastatin 10 MG tablet    ZOCOR    90 tablet    Take 1 tablet (10 mg) by mouth At Bedtime    Type 2 diabetes mellitus with diabetic neuropathy, without long-term current use of insulin (H)

## 2017-11-08 NOTE — PATIENT INSTRUCTIONS
Will increase metformin to 1000 mg twice daily to better control your diabetes.  Start testing your glucose more frequently, 1-2 times daily, once in the morning and once either before dinner or 2 hours after dinner. Goal in the morning is less than 120 and 2 hours after dinner less than 180.    Will change dose of simvastatin to 10 mg daily.    Work on a healthier diet with less carbs and portion sizes. Try to exercise more frequently.    Complete a fasting lipid panel in the near future.    Follow up in 3 months.

## 2017-11-08 NOTE — NURSING NOTE
"Chief Complaint   Patient presents with     Diabetes     Health Maintenance     flu, fit, honoring choics, a1c, microalbumin, foot exam,        Initial /82 (BP Location: Right arm, Patient Position: Chair, Cuff Size: Adult Regular)  Pulse 78  Temp 97  F (36.1  C) (Temporal)  Resp 20  Ht 5' 8.11\" (1.73 m)  Wt (!) 318 lb (144.2 kg)  SpO2 98%  BMI 48.2 kg/m2 Estimated body mass index is 48.2 kg/(m^2) as calculated from the following:    Height as of this encounter: 5' 8.11\" (1.73 m).    Weight as of this encounter: 318 lb (144.2 kg).  Medication Reconciliation: complete     Hilaria Abreu, LINDA      "

## 2018-01-24 ENCOUNTER — TELEPHONE (OUTPATIENT)
Dept: FAMILY MEDICINE | Facility: OTHER | Age: 58
End: 2018-01-24

## 2018-01-24 NOTE — TELEPHONE ENCOUNTER
Summary:    Patient is due/failing the following:   FIT    Action needed:   Complete a FIT test     Type of outreach:    Phone, left message for patient to call back.     Questions for provider review:    None                                                                                                                                    Jeane Hannafco     Chart routed to Care Team .        Panel Management Review      Patient has the following on his problem list:     Diabetes    ASA: Passed    Last A1C  Lab Results   Component Value Date    A1C 7.8 11/08/2017    A1C 6.2 04/14/2017    A1C 6.4 12/16/2016    A1C 6.9 09/12/2016    A1C 7.9 05/23/2016     A1C tested: Passed    Last LDL:    Lab Results   Component Value Date    CHOL 124 12/16/2016     Lab Results   Component Value Date    HDL 32 12/16/2016     Lab Results   Component Value Date    LDL 63 12/16/2016     Lab Results   Component Value Date    TRIG 145 12/16/2016     Lab Results   Component Value Date    CHOLHDLRATIO 7.0 03/25/2009     Lab Results   Component Value Date    NHDL 92 12/16/2016       Is the patient on a Statin? YES             Is the patient on Aspirin? YES    Medications     HMG CoA Reductase Inhibitors    simvastatin (ZOCOR) 10 MG tablet    Salicylates    aspirin 81 MG tablet          Last three blood pressure readings:  BP Readings from Last 3 Encounters:   11/08/17 134/82   04/14/17 136/78   12/16/16 126/78          Tobacco History:     History   Smoking Status     Never Smoker   Smokeless Tobacco     Never Used         Hypertension   Last three blood pressure readings:  BP Readings from Last 3 Encounters:   11/08/17 134/82   04/14/17 136/78   12/16/16 126/78     Blood pressure: Passed    HTN Guidelines:  Age 18-59 BP range:  Less than 140/90  Age 60-85 with Diabetes:  Less than 140/90  Age 60-85 without Diabetes:  less than 150/90      Composite cancer screening  Chart review shows that this patient is due/due soon for the following  Fecal Colorectal (FIT)

## 2018-01-24 NOTE — LETTER
[address]   [facility] [address]  Phone: [phone]  January 30, 2018      Chung Lincoln  [address] [address] [place] [id number]      Dear Chung,    We care about your health and have reviewed your health plan including your medical conditions, medications, and lab results.  Based on this review, it is recommended that you follow up regarding the following health topic(s):  -Colon Cancer Screening    We recommend you take the following action(s):  -schedule a COLONOSCOPY to look for colon cancer (due every 10 years or 5 years in higher risk situations.)  Colonoscopies can prevent 90-95% of colon cancer deaths.  Problem lesions can be removed before they ever become cancer.  If you do not wish to do a colonoscopy or cannot afford to do one at this time, there is another option called a Fecal Immunochemical Occult Blood Test (FIT) a take home stool sample kit.  It does not replace the colonoscopy for colorectal cancer screening, but it can detect hidden bleeding in the lower colon.  It does need to be repeated every year and if a positive result is obtained, you would be referred for a colonoscopy.  If you have completed either one of these tests at another facility, please have the records sent to our clinic for our records.     Please call us at the [facility] - [phone] (or use [employer]) to address the above recommendations.     Thank you for trusting [facility] and we appreciate the opportunity to serve you.  We look forward to supporting your healthcare needs in the future.    Healthy Regards,    Your Health Care Team  [facility]

## 2018-01-30 DIAGNOSIS — E11.9 TYPE 2 DIABETES MELLITUS WITHOUT COMPLICATION (H): ICD-10-CM

## 2018-02-01 RX ORDER — BLOOD SUGAR DIAGNOSTIC
STRIP MISCELLANEOUS
Qty: 30 STRIP | Refills: 0 | Status: SHIPPED | OUTPATIENT
Start: 2018-02-01 | End: 2018-02-15

## 2018-02-01 NOTE — TELEPHONE ENCOUNTER
Test strips:  Medication is being filled for 1 time refill only due to:  Patient needs to be seen because due for diabetic exam. .    Next 5 appointments (look out 90 days)     Feb 15, 2018  4:15 PM CST   [facility] Visit with Toy Martinez PA-C   [facility]    [address] [place] [id number]   [phone]                Josie Tejada, RN, BSN

## 2018-02-07 NOTE — PROGRESS NOTES
"  SUBJECTIVE:   Chung Lincoln is a 57 year old male who presents to clinic today for the following health issues:      HPI     Diabetes Follow-up    Patient is checking blood sugars: once daily.  Results are as follows:         am - 135    Diabetic concerns: None     Symptoms of hypoglycemia (low blood sugar): weak when below 80     Paresthesias (numbness or burning in feet) or sores: Yes      Date of last diabetic eye exam: Yes    He has improved his diet and has lost almost 20 pounds since his last visit.     Hyperlipidemia Follow-Up      Rate your low fat/cholesterol diet?: good    Taking statin?  Yes, no muscle aches from statin    Other lipid medications/supplements?:  none    Hypertension Follow-up      Outpatient blood pressures are not being checked.    Low Salt Diet: not monitoring salt    BP Readings from Last 2 Encounters:   02/15/18 136/84   11/08/17 134/82     Hemoglobin A1C (%)   Date Value   02/15/2018 7.2 (H)   11/08/2017 7.8 (H)     LDL Cholesterol Calculated (mg/dL)   Date Value   12/16/2016 63   04/21/2016 86       Problem list and histories reviewed & adjusted, as indicated.  Additional history: none    ROS:  GENERAL: Denies fever, fatigue, weakness, weight gain, or weight loss.  CARDIOVASCULAR: Denies chest pain, shortness of breath, irregular heartbeats,  palpitations, or edema.  RESPIRATORY: Denies cough, hemoptysis, and shortness of breath.  NEUROLOGIC: +Occasional sharp pains into feet. Denies headache, fainting, dizziness, memory loss, numbness, tingling, or seizures.    OBJECTIVE:     /84 (BP Location: Right arm, Patient Position: Chair, Cuff Size: Adult Regular)  Pulse 74  Temp 97.8  F (36.6  C) (Temporal)  Resp 16  Ht 5' 8.11\" (1.73 m)  Wt (!) 302 lb (137 kg)  SpO2 97%  BMI 45.77 kg/m2  Body mass index is 45.77 kg/(m^2).  GENERAL: healthy, alert and no distress  RESP: lungs clear to auscultation - no rales, rhonchi or wheezes  CV: regular rate and rhythm, normal S1 S2, no " S3 or S4, no murmur, click or rub, no peripheral edema    Lab Results   Component Value Date    A1C 7.2 02/15/2018    A1C 7.8 11/08/2017    A1C 6.2 04/14/2017    A1C 6.4 12/16/2016    A1C 6.9 09/12/2016       ASSESSMENT/PLAN:       ICD-10-CM    1. Type 2 diabetes mellitus with diabetic neuropathy, without long-term current use of insulin (H) E11.40 Lipid panel reflex to direct LDL Fasting     **A1C FUTURE 3mo     **A1C FUTURE 3mo     blood glucose monitoring (ACCU-CHEK SAMANTHA PLUS) test strip   2. Essential hypertension with goal blood pressure less than 140/90 I10    3. Hyperlipidemia LDL goal <100 E78.5    4. Screen for colon cancer Z12.11          1. A1c improved from 7.8 to 7.2. He has been losing weight and working on a healthier diet. I do not think we need to make any medication changes at this time as he is almost at goal. Will plan on follow up and A1c recheck in 3 months.    2. Stable, continue lisinopril.    3. He will completed an updated fasting lipid panel over the next week.    4. He states he just sent in his FIT testing.     Toy Martinez PA-C  [facility]

## 2018-02-15 ENCOUNTER — OFFICE VISIT (OUTPATIENT)
Dept: FAMILY MEDICINE | Facility: OTHER | Age: 58
End: 2018-02-15
Payer: COMMERCIAL

## 2018-02-15 VITALS
RESPIRATION RATE: 16 BRPM | HEART RATE: 74 BPM | BODY MASS INDEX: 45.77 KG/M2 | HEIGHT: 68 IN | OXYGEN SATURATION: 97 % | SYSTOLIC BLOOD PRESSURE: 136 MMHG | TEMPERATURE: 97.8 F | DIASTOLIC BLOOD PRESSURE: 84 MMHG | WEIGHT: 302 LBS

## 2018-02-15 DIAGNOSIS — E11.40 TYPE 2 DIABETES MELLITUS WITH DIABETIC NEUROPATHY, WITHOUT LONG-TERM CURRENT USE OF INSULIN (H): Primary | ICD-10-CM

## 2018-02-15 DIAGNOSIS — I10 ESSENTIAL HYPERTENSION WITH GOAL BLOOD PRESSURE LESS THAN 140/90: ICD-10-CM

## 2018-02-15 DIAGNOSIS — E78.5 HYPERLIPIDEMIA LDL GOAL <100: ICD-10-CM

## 2018-02-15 DIAGNOSIS — Z12.11 SCREEN FOR COLON CANCER: ICD-10-CM

## 2018-02-15 LAB — HBA1C MFR BLD: 7.2 % (ref 4.3–6)

## 2018-02-15 PROCEDURE — 99214 OFFICE O/P EST MOD 30 MIN: CPT | Performed by: PHYSICIAN ASSISTANT

## 2018-02-15 PROCEDURE — 83036 HEMOGLOBIN GLYCOSYLATED A1C: CPT | Performed by: PHYSICIAN ASSISTANT

## 2018-02-15 PROCEDURE — 36415 COLL VENOUS BLD VENIPUNCTURE: CPT | Performed by: PHYSICIAN ASSISTANT

## 2018-02-15 NOTE — MR AVS SNAPSHOT
After Visit Summary   2/15/2018    Chung Lincoln    MRN: [id number]           Patient Information     Date Of Birth          1960        Visit Information        Provider Department      2/15/2018 4:15 PM Toy Martinez PA-C [facility]        Today's Diagnoses     Type 2 diabetes mellitus with diabetic neuropathy, without long-term current use of insulin (H)    -  1    Essential hypertension with goal blood pressure less than 140/90        Hyperlipidemia LDL goal <100        Screen for colon cancer          Care Instructions    Continue with a healthy diet with limited carbs and close monitoring of your portions sizes.  Stay as active as possible.  Complete a fasting lipid panel within the next week.  Follow up in 3 months for a recheck.              Follow-ups after your visit        Future tests that were ordered for you today     Open Future Orders        Priority Expected Expires Ordered    **A1C FUTURE 3mo Routine 5/16/2018 6/15/2018 2/15/2018    Lipid panel reflex to direct LDL Fasting Routine 2/19/2018 3/26/2018 2/15/2018            Who to contact     If you have questions or need follow up information about today's clinic visit or your schedule please contact [facility] directly at [phone].  Normal or non-critical lab and imaging results will be communicated to you by MyChart, letter or phone within 4 business days after the clinic has received the results. If you do not hear from us within 7 days, please contact the clinic through [employer] or phone. If you have a critical or abnormal lab result, we will notify you by phone as soon as possible.  Submit refill requests through [employer] or call your pharmacy and they will forward the refill request to us. Please allow 3 business days for your refill to be completed.          Additional Information About Your Visit        MyChart Information     [employer] lets you send messages to your doctor, view your  "test results, renew your prescriptions, schedule appointments and more. To sign up, go to [email or web address] . Click on \"Log in\" on the left side of the screen, which will take you to the Welcome page. Then click on \"Sign up Now\" on the right side of the page.     You will be asked to enter the access code listed below, as well as some personal information. Please follow the directions to create your username and password.     Your access code is: [email or web address]  Expires: 2018  4:47 PM     Your access code will  in 90 days. If you need help or a new code, please call your [facility] clinic or [phone].        Care EveryWhere ID     This is your Care EveryWhere ID. This could be used by other organizations to access your [facility] medical records  [id number]        Your Vitals Were     Pulse Temperature Respirations Height Pulse Oximetry BMI (Body Mass Index)    74 97.8  F (36.6  C) (Temporal) 16 5' 8.11\" (1.73 m) 97% 45.77 kg/m2       Blood Pressure from Last 3 Encounters:   02/15/18 136/84   17 134/82   17 136/78    Weight from Last 3 Encounters:   02/15/18 (!) 302 lb (137 kg)   17 (!) 318 lb (144.2 kg)   17 278 lb 3.2 oz (126.2 kg)              We Performed the Following     **A1C FUTURE 3mo          Today's Medication Changes          These changes are accurate as of 2/15/18  4:47 PM.  If you have any questions, ask your nurse or doctor.               These medicines have changed or have updated prescriptions.        Dose/Directions    blood glucose monitoring test strip   Commonly known as:  ACCU-CHEK SAMANTHA PLUS   This may have changed:  See the new instructions.   Used for:  Type 2 diabetes mellitus with diabetic neuropathy, without long-term current use of insulin (H)   Changed by:  Toy Martinez PA-C        USE TO TEST BLOOD SUGARS TWICE DAILY OR AS DIRECTED   Quantity:  100 strip   Refills:  5            Where to get your medicines      These medications were " [facility] [place], [place] - [address]  [address], [facility] [address]     Phone:  [phone]     blood glucose monitoring test strip                Primary Care Provider Office Phone # Fax #    Toy Martinez PA-C [phone]       [address] [facility] [address]  [facility] [address]        Equal Access to Services     NICK SANDHU : Hadii aad ku hadasho Soomaali, waaxda luqadaha, qaybta kaalmada adeegyada, waxay idiin hayaan adeeg kharash la'aan ah. So [facility] [phone].    ATENCIÓN: Si habla español, tiene a villegas disposición servicios gratuitos de asistencia lingüística. [place] al [phone].    We comply with applicable federal civil rights laws and [place] laws. We do not discriminate on the basis of race, color, national origin, age, disability, sex, sexual orientation, or gender identity.            Thank you!     Thank you for choosing [facility]  for your care. Our goal is always to provide you with excellent care. Hearing back from our patients is one way we can continue to improve our services. Please take a few minutes to complete the written survey that you may receive in the mail after your visit with us. Thank you!             Your Updated Medication List - Protect others around you: Learn how to safely use, store and throw away your medicines at www.disposemymeds.org.          This list is accurate as of 2/15/18  4:47 PM.  Always use your most recent med list.                   Brand Name Dispense Instructions for use Diagnosis    aspirin 81 MG tablet      Take by mouth daily        blood glucose lancets standard    no brand specified    1 Box    Use to test blood sugar 1 times daily or as directed.    Type 2 diabetes mellitus without complication (H)       blood glucose monitoring meter device kit    no brand specified    1 kit    Use to test blood sugar 1 times daily or as directed.    Type 2 diabetes mellitus without complication (H)       blood  glucose monitoring test strip    ACCU-CHEK SAMANTHA PLUS    100 strip    USE TO TEST BLOOD SUGARS TWICE DAILY OR AS DIRECTED    Type 2 diabetes mellitus with diabetic neuropathy, without long-term current use of insulin (H)       lisinopril 20 MG tablet    PRINIVIL/ZESTRIL    90 tablet    Take 1 tablet (20 mg) by mouth daily    Type 2 diabetes mellitus without complication, without long-term current use of insulin (H)       metFORMIN 500 MG tablet    GLUCOPHAGE    360 tablet    Take 2 tablets (1,000 mg) by mouth 2 times daily (with meals)    Type 2 diabetes mellitus with diabetic neuropathy, without long-term current use of insulin (H)       order for DME     1    needs new CPAP machine    Hypersomnia with sleep apnea, unspecified       PRILOSEC OTC PO           simvastatin 10 MG tablet    ZOCOR    90 tablet    Take 1 tablet (10 mg) by mouth At Bedtime    Hyperlipidemia LDL goal <100

## 2018-02-15 NOTE — NURSING NOTE
"Chief Complaint   Patient presents with     Diabetes     Panel Management     height, fit, honoring choices, lipids       Initial /84 (BP Location: Right arm, Patient Position: Chair, Cuff Size: Adult Regular)  Pulse 74  Temp 97.8  F (36.6  C) (Temporal)  Resp 16  Ht 5' 8.11\" (1.73 m)  Wt (!) 302 lb (137 kg)  SpO2 97%  BMI 45.77 kg/m2 Estimated body mass index is 45.77 kg/(m^2) as calculated from the following:    Height as of this encounter: 5' 8.11\" (1.73 m).    Weight as of this encounter: 302 lb (137 kg).  Medication Reconciliation: complete     Hilaria Abreu CMA      "

## 2018-02-15 NOTE — PATIENT INSTRUCTIONS
Continue with a healthy diet with limited carbs and close monitoring of your portions sizes.  Stay as active as possible.  Complete a fasting lipid panel within the next week.  Follow up in 3 months for a recheck.

## 2018-02-16 ENCOUNTER — APPOINTMENT (OUTPATIENT)
Dept: LAB | Facility: OTHER | Age: 58
End: 2018-02-16
Payer: COMMERCIAL

## 2018-02-16 PROCEDURE — 82274 ASSAY TEST FOR BLOOD FECAL: CPT | Performed by: PHYSICIAN ASSISTANT

## 2018-02-19 ENCOUNTER — TELEPHONE (OUTPATIENT)
Dept: LAB | Facility: OTHER | Age: 58
End: 2018-02-19

## 2018-02-19 DIAGNOSIS — Z12.11 COLON CANCER SCREENING: Primary | ICD-10-CM

## 2018-02-19 DIAGNOSIS — Z12.11 COLON CANCER SCREENING: ICD-10-CM

## 2018-02-19 LAB — HEMOCCULT STL QL IA: NEGATIVE

## 2018-02-19 NOTE — TELEPHONE ENCOUNTER
[facility] called and stated that they have a FIT test without orders.   Please place orders as needed.  Thank you  Rika SCRUGGS) [facility]

## 2018-03-14 DIAGNOSIS — E11.40 TYPE 2 DIABETES MELLITUS WITH DIABETIC NEUROPATHY, WITHOUT LONG-TERM CURRENT USE OF INSULIN (H): ICD-10-CM

## 2018-03-14 LAB
CHOLEST SERPL-MCNC: 80 MG/DL
HBA1C MFR BLD: 6.6 % (ref 4.3–6)
HDLC SERPL-MCNC: 20 MG/DL
LDLC SERPL CALC-MCNC: 28 MG/DL
NONHDLC SERPL-MCNC: 60 MG/DL
TRIGL SERPL-MCNC: 162 MG/DL

## 2018-03-14 PROCEDURE — 80061 LIPID PANEL: CPT | Performed by: PHYSICIAN ASSISTANT

## 2018-03-14 PROCEDURE — 36415 COLL VENOUS BLD VENIPUNCTURE: CPT | Performed by: PHYSICIAN ASSISTANT

## 2018-04-30 DIAGNOSIS — E11.9 TYPE 2 DIABETES MELLITUS WITHOUT COMPLICATION, WITHOUT LONG-TERM CURRENT USE OF INSULIN (H): ICD-10-CM

## 2018-05-01 RX ORDER — LISINOPRIL 20 MG/1
20 TABLET ORAL DAILY
Qty: 30 TABLET | Refills: 0 | Status: SHIPPED | OUTPATIENT
Start: 2018-05-01 | End: 2018-05-10

## 2018-05-01 NOTE — TELEPHONE ENCOUNTER
"Requested Prescriptions   Pending Prescriptions Disp Refills     lisinopril (PRINIVIL/ZESTRIL) 20 MG tablet 90 tablet 3     Sig: Take 1 tablet (20 mg) by mouth daily    ACE Inhibitors (Including Combos) Protocol Passed    4/30/2018 10:53 AM       Passed - Blood pressure under 140/90 in past 12 months    BP Readings from Last 3 Encounters:   02/15/18 136/84   11/08/17 134/82   04/14/17 136/78          Passed - Recent (12 mo) or future (30 days) visit within the authorizing provider's specialty    Patient had office visit in the last 12 months or has a visit in the next 30 days with authorizing provider or within the authorizing provider's specialty.  See \"Patient Info\" tab in inbasket, or \"Choose Columns\" in Meds & Orders section of the refill encounter.           Passed - Patient is age 18 or older       Passed - Normal serum creatinine on file in past 12 months    Recent Labs   Lab Test  11/08/17   1548   CR  0.88          Passed - Normal serum potassium on file in past 12 months    Recent Labs   Lab Test  11/08/17   1548   POTASSIUM  4.0           lisinopril (PRINIVIL/ZESTRIL) 20 MG tablet  Medication is being filled for 1 time refill only due to:  Patient needs to be seen because due for 3 month follow up - diabetic .   Please assist with scheduling.    Shayy Lagunas, RN, BSN         "

## 2018-05-02 NOTE — TELEPHONE ENCOUNTER
Lm for patient to call us back to inform rx sent to pharmacy but will need an office visit before next appt

## 2018-05-07 NOTE — PROGRESS NOTES
SUBJECTIVE:   Chung Lincoln is a 58 year old male who presents to clinic today for the following health issues:      History of Present Illness     Diabetes:     Frequency of checking blood sugars::  Rarely    Diabetic concerns::  None    Hypoglycemia symptoms::  None    Paraesthesia present::  YES    Eye Exam in the last year::  Yes    nov     He has been working diligently to eat a healthier diet and stay active. He does not check his glucose very often but it is usually in the low 100's when he does check it. Bilateral foot paresthesias have not worsened.     He continues to take daily omeprazole, rotating between 1 and 1/2 tablet every other day. If he does not take it, he will get heart burn regardless of what he is eating.    Problem list and histories reviewed & adjusted, as indicated.  Additional history: none    ROS:  GENERAL: Denies fever, fatigue, weakness, weight gain, or weight loss.  CARDIOVASCULAR: Denies chest pain, shortness of breath, irregular heartbeats,  palpitations, or edema.  RESPIRATORY: Denies cough, hemoptysis, and shortness of breath.  GASTROINTESTINAL: +Heartburn, well controlled with omeprazole. Denies nausea, vomiting, change in appetite, abdominal pain, diarrhea, or constipation.  NEUROLOGIC: Denies headache, fainting, dizziness, memory loss, or seizures.    OBJECTIVE:     /80 (BP Location: Right arm, Patient Position: Chair, Cuff Size: Adult Regular)  Pulse 71  Temp 97.7  F (36.5  C) (Temporal)  Resp 18  Wt 292 lb (132.5 kg)  SpO2 99%  BMI 44.26 kg/m2  Body mass index is 44.26 kg/(m^2).  GENERAL: healthy, alert and no distress  RESP: lungs clear to auscultation - no rales, rhonchi or wheezes  CV: regular rate and rhythm, normal S1 S2, no S3 or S4, no murmur, click or rub  PSYCH: mentation appears normal, affect normal/bright    Lab Results   Component Value Date    A1C 6.5 05/10/2018    A1C 6.6 03/14/2018    A1C 7.2 02/15/2018    A1C 7.8 11/08/2017    A1C 6.2  04/14/2017       ASSESSMENT/PLAN:       ICD-10-CM    1. Type 2 diabetes mellitus with diabetic neuropathy, without long-term current use of insulin (H) E11.40 TSH WITH FREE T4 REFLEX     Hemoglobin A1c     TSH WITH FREE T4 REFLEX     Hemoglobin A1c     metFORMIN (GLUCOPHAGE) 500 MG tablet   2. Morbid obesity (H) E66.01    3. Essential hypertension with goal blood pressure less than 140/90 I10 lisinopril (PRINIVIL/ZESTRIL) 20 MG tablet   4. Gastroesophageal reflux disease, esophagitis presence not specified K21.9        1-2. His A1c has improved to 6.5 which is great! I congratulated him on changing his lifestyle and losing 10 pounds since his last visit. I encouraged him to continue with this healthy diet and routine exercise and will plan on follow up in 6 months.    3. Stable, continue lisinopril.    4. I discussed potential long-term side effects of omeprazole and recommend he try Pepcid or Zantac instead. If not working well, he can go back to the omeprazole.         Toy Martinez PA-C  [facility]

## 2018-05-10 ENCOUNTER — OFFICE VISIT (OUTPATIENT)
Dept: FAMILY MEDICINE | Facility: OTHER | Age: 58
End: 2018-05-10
Payer: COMMERCIAL

## 2018-05-10 VITALS
BODY MASS INDEX: 44.26 KG/M2 | WEIGHT: 292 LBS | HEART RATE: 71 BPM | DIASTOLIC BLOOD PRESSURE: 80 MMHG | RESPIRATION RATE: 18 BRPM | OXYGEN SATURATION: 99 % | TEMPERATURE: 97.7 F | SYSTOLIC BLOOD PRESSURE: 126 MMHG

## 2018-05-10 DIAGNOSIS — K21.9 GASTROESOPHAGEAL REFLUX DISEASE, ESOPHAGITIS PRESENCE NOT SPECIFIED: ICD-10-CM

## 2018-05-10 DIAGNOSIS — E66.01 MORBID OBESITY (H): ICD-10-CM

## 2018-05-10 DIAGNOSIS — I10 ESSENTIAL HYPERTENSION WITH GOAL BLOOD PRESSURE LESS THAN 140/90: ICD-10-CM

## 2018-05-10 DIAGNOSIS — E11.40 TYPE 2 DIABETES MELLITUS WITH DIABETIC NEUROPATHY, WITHOUT LONG-TERM CURRENT USE OF INSULIN (H): Primary | ICD-10-CM

## 2018-05-10 LAB
HBA1C MFR BLD: 6.5 % (ref 0–5.6)
TSH SERPL DL<=0.005 MIU/L-ACNC: 2.74 MU/L (ref 0.4–4)

## 2018-05-10 PROCEDURE — 83036 HEMOGLOBIN GLYCOSYLATED A1C: CPT | Performed by: PHYSICIAN ASSISTANT

## 2018-05-10 PROCEDURE — 84443 ASSAY THYROID STIM HORMONE: CPT | Performed by: PHYSICIAN ASSISTANT

## 2018-05-10 PROCEDURE — 36415 COLL VENOUS BLD VENIPUNCTURE: CPT | Performed by: PHYSICIAN ASSISTANT

## 2018-05-10 PROCEDURE — 99214 OFFICE O/P EST MOD 30 MIN: CPT | Performed by: PHYSICIAN ASSISTANT

## 2018-05-10 RX ORDER — LISINOPRIL 20 MG/1
20 TABLET ORAL DAILY
Qty: 90 TABLET | Refills: 3 | Status: SHIPPED | OUTPATIENT
Start: 2018-05-10 | End: 2019-03-04

## 2018-05-10 NOTE — PATIENT INSTRUCTIONS
Continue with current medications. I am proud of you for losing weight and focusing on lifestyle changes.    I recommend trying over the counter Pepcid or Zantac twice daily instead of the omeprazole. If not working, you can go back to omeprazole.     Follow up in 6 months for a recheck.

## 2018-05-10 NOTE — MR AVS SNAPSHOT
After Visit Summary   5/10/2018    Chung Lincoln    MRN: [id number]           Patient Information     Date Of Birth          1960        Visit Information        Provider Department      5/10/2018 4:15 PM Toy Martinez PA-C [facility]        Today's Diagnoses     Type 2 diabetes mellitus with diabetic neuropathy, without long-term current use of insulin (H)    -  1    Morbid obesity (H)        Essential hypertension with goal blood pressure less than 140/90          Care Instructions    Continue with current medications. I am proud of you for losing weight and focusing on lifestyle changes.    I recommend trying over the counter Pepcid or Zantac twice daily instead of the omeprazole. If not working, you can go back to omeprazole.     Follow up in 6 months for a recheck.              Follow-ups after your visit        Follow-up notes from your care team     Return in about 6 months (around 11/10/2018).      Who to contact     If you have questions or need follow up information about today's clinic visit or your schedule please contact [facility] directly at [phone].  Normal or non-critical lab and imaging results will be communicated to you by [employer], letter or phone within 4 business days after the clinic has received the results. If you do not hear from us within 7 days, please contact the clinic through [employer] or phone. If you have a critical or abnormal lab result, we will notify you by phone as soon as possible.  Submit refill requests through [employer] or call your pharmacy and they will forward the refill request to us. Please allow 3 business days for your refill to be completed.          Additional Information About Your Visit        MyChart Information     [employer] gives you secure access to your electronic health record. If you see a primary care provider, you can also send messages to your care team and make appointments. If you have  questions, please call your primary care clinic.  If you do not have a primary care provider, please call [phone] and they will assist you.        Care EveryWhere ID     This is your Care EveryWhere ID. This could be used by other organizations to access your [facility] medical records  [id number]        Your Vitals Were     Pulse Temperature Respirations Pulse Oximetry BMI (Body Mass Index)       71 97.7  F (36.5  C) (Temporal) 18 99% 44.26 kg/m2        Blood Pressure from Last 3 Encounters:   05/10/18 126/80   02/15/18 136/84   11/08/17 134/82    Weight from Last 3 Encounters:   05/10/18 292 lb (132.5 kg)   02/15/18 (!) 302 lb (137 kg)   11/08/17 (!) 318 lb (144.2 kg)              We Performed the Following     Hemoglobin A1c     TSH WITH FREE T4 REFLEX          Where to get your medicines      These medications were sent to [facility]  [address], [facility] [address]     Phone:  [phone]     lisinopril 20 MG tablet    metFORMIN 500 MG tablet          Primary Care Provider Office Phone # Fax #    Toy Martinez PA-C [phone]       [address] [facility] [address]  [facility] [address]        Equal Access to Services     NICK SANDHU : Hadii adelaide ku hadasho Soomaali, waaxda luqadaha, qaybta kaalmada adeegyada, edmund barrera haymadhuri fragoso . So [facility] [phone].    ATENCIÓN: Si habla español, tiene a villegas disposición servicios gratuitos de asistencia lingüística. [place] al [phone].    We comply with applicable federal civil rights laws and [place] laws. We do not discriminate on the basis of race, color, national origin, age, disability, sex, sexual orientation, or gender identity.            Thank you!     Thank you for choosing [facility]  for your care. Our goal is always to provide you with excellent care. Hearing back from our patients is one way we can continue to improve our services. Please take a few minutes to  complete the written survey that you may receive in the mail after your visit with us. Thank you!             Your Updated Medication List - Protect others around you: Learn how to safely use, store and throw away your medicines at www.disposemymeds.org.          This list is accurate as of 5/10/18  4:50 PM.  Always use your most recent med list.                   Brand Name Dispense Instructions for use Diagnosis    aspirin 81 MG tablet      Take by mouth daily        blood glucose lancets standard    no brand specified    1 Box    Use to test blood sugar 1 times daily or as directed.    Type 2 diabetes mellitus without complication (H)       blood glucose monitoring meter device kit    no brand specified    1 kit    Use to test blood sugar 1 times daily or as directed.    Type 2 diabetes mellitus without complication (H)       blood glucose monitoring test strip    ACCU-CHEK SAMANTHA PLUS    100 strip    USE TO TEST BLOOD SUGARS TWICE DAILY OR AS DIRECTED    Type 2 diabetes mellitus with diabetic neuropathy, without long-term current use of insulin (H)       lisinopril 20 MG tablet    PRINIVIL/ZESTRIL    90 tablet    Take 1 tablet (20 mg) by mouth daily    Essential hypertension with goal blood pressure less than 140/90       metFORMIN 500 MG tablet    GLUCOPHAGE    360 tablet    Take 2 tablets (1,000 mg) by mouth 2 times daily (with meals)    Type 2 diabetes mellitus with diabetic neuropathy, without long-term current use of insulin (H)       order for DME     1    needs new CPAP machine    Hypersomnia with sleep apnea, unspecified       PRILOSEC OTC PO           simvastatin 10 MG tablet    ZOCOR    90 tablet    Take 1 tablet (10 mg) by mouth At Bedtime    Hyperlipidemia LDL goal <100

## 2018-05-10 NOTE — NURSING NOTE
"Chief Complaint   Patient presents with     Diabetes     Panel Management     honoring choices, hiv, tsh       Initial /80 (BP Location: Right arm, Patient Position: Chair, Cuff Size: Adult Regular)  Pulse 71  Temp 97.7  F (36.5  C) (Temporal)  Resp 18  Wt 292 lb (132.5 kg)  SpO2 99%  BMI 44.26 kg/m2 Estimated body mass index is 44.26 kg/(m^2) as calculated from the following:    Height as of 2/15/18: 5' 8.11\" (1.73 m).    Weight as of this encounter: 292 lb (132.5 kg).  Medication Reconciliation: complete     Hilaria Abreu, LINDA      "

## 2018-05-11 DIAGNOSIS — E11.40 TYPE 2 DIABETES MELLITUS WITH DIABETIC NEUROPATHY, WITHOUT LONG-TERM CURRENT USE OF INSULIN (H): ICD-10-CM

## 2018-11-06 DIAGNOSIS — E11.40 TYPE 2 DIABETES MELLITUS WITH DIABETIC NEUROPATHY, WITHOUT LONG-TERM CURRENT USE OF INSULIN (H): ICD-10-CM

## 2018-11-07 NOTE — TELEPHONE ENCOUNTER
Metformin:  Medication is being filled for 1 time refill only due to:  Patient needs to be seen because diabetic OV.  Josie Tejada, RN, BSN

## 2018-11-07 NOTE — TELEPHONE ENCOUNTER
Called patient and informed him of Rx being sent. Scheduled patient for follow up diabetic appt on 11/29. Pro [place] MA

## 2018-11-26 DIAGNOSIS — E78.5 HYPERLIPIDEMIA LDL GOAL <100: ICD-10-CM

## 2018-11-26 NOTE — PROGRESS NOTES
"  SUBJECTIVE:   Chung Lincoln is a 58 year old male who presents to clinic today for the following health issues:    HPI     Diabetes Follow-up      Patient is checking blood sugars: rarely/weekly.  Results around 140.    Diabetic concerns: None     Symptoms of hypoglycemia (low blood sugar): none     Paresthesias (numbness or burning in feet) or sores: Yes      Date of last diabetic eye exam: \"last winter    Diabetes Management Resources    He has been eating more carbs lately so states his glucose has been slightly higher. He notices increased numbness in his feet when he eats more carbs.     Hyperlipidemia Follow-Up      Rate your low fat/cholesterol diet?: good    Taking statin?  Yes, possible muscle aches from statin \"leg cramps at night but not sure if it's dehydration or from the meds\"    Other lipid medications/supplements?:  none    He has tried taking magnesium and potassium supplements without benefit. He states he does not drink enough water during the day. He has tried to drink more recently and the cramps seem to be improving. He denies cramping during the day.     Hypertension Follow-up      Outpatient blood pressures are not being checked.    Low Salt Diet: low salt    BP Readings from Last 2 Encounters:   11/29/18 128/72   05/10/18 126/80     Hemoglobin A1C (%)   Date Value   11/29/2018 7.3 (H)   05/10/2018 6.5 (H)     LDL Cholesterol Calculated (mg/dL)   Date Value   03/14/2018 28   12/16/2016 63     Problem list and histories reviewed & adjusted, as indicated.  Additional history: none    ROS:  GENERAL: Denies fever, fatigue, weakness, weight gain, or weight loss.  CARDIOVASCULAR: Denies chest pain, shortness of breath, irregular heartbeats, palpitations, or edema.  RESPIRATORY: Denies cough, hemoptysis, and shortness of breath.  MUSCULOSKELETAL: +Leg cramping at night. Denies arthralgias or muscle weakness.  NEUROLOGIC: Denies headache, fainting, dizziness, memory loss, numbness, tingling, or " seizures.  PSYCHIATRIC: Denies depression, anxiety, mood swings, and thoughts of suicide.    OBJECTIVE:     /72  Pulse 72  Temp 97.4  F (36.3  C) (Temporal)  Resp 14  Wt 295 lb (133.8 kg)  SpO2 98%  BMI 44.71 kg/m2  Body mass index is 44.71 kg/(m^2).  GENERAL: healthy, alert and no distress  RESP: lungs clear to auscultation - no rales, rhonchi or wheezes  CV: regular rate and rhythm, normal S1 S2, no S3 or S4, no murmur, click or rub, no peripheral edema and peripheral pulses strong  NEURO: Normal strength and tone, mentation intact and speech normal. Gait is stable.   PSYCH: mentation appears normal, affect normal/bright  Diabetic foot exam: normal DP and PT pulses, no trophic changes or ulcerative lesions, normal sensory exam and normal monofilament exam, except for decreased sensation over area 1 on the right.            Lab Results   Component Value Date    A1C 7.3 11/29/2018    A1C 6.5 05/10/2018    A1C 6.6 03/14/2018    A1C 7.2 02/15/2018    A1C 7.8 11/08/2017       ASSESSMENT/PLAN:       ICD-10-CM    1. Type 2 diabetes mellitus with diabetic neuropathy, without long-term current use of insulin (H) E11.40 FOOT EXAM  NO CHARGE [79270.114]     HEMOGLOBIN A1C     Albumin Random Urine Quantitative with Creat Ratio     Comprehensive metabolic panel (BMP + Alb, Alk Phos, ALT, AST, Total. Bili, TP)     HEMOGLOBIN A1C     Albumin Random Urine Quantitative with Creat Ratio     Comprehensive metabolic panel (BMP + Alb, Alk Phos, ALT, AST, Total. Bili, TP)     metFORMIN (GLUCOPHAGE) 1000 MG tablet   2. Essential hypertension with goal blood pressure less than 140/90 I10 Comprehensive metabolic panel (BMP + Alb, Alk Phos, ALT, AST, Total. Bili, TP)     Comprehensive metabolic panel (BMP + Alb, Alk Phos, ALT, AST, Total. Bili, TP)   3. STEVEN (obstructive sleep apnea) G47.33    4. Morbid obesity (H) E66.01    5. Hyperlipidemia LDL goal <100 E78.5 simvastatin (ZOCOR) 10 MG tablet   6. Bilateral leg cramps R25.2     7. Need for prophylactic vaccination and inoculation against influenza Z23 FLU VACCINE, (RIV4) RECOMBINANT HA  , IM (FluBlok, egg free) [69616]- >18 YRS ([facility] recommended  50-64 YRS)     Vaccine Administration, Initial [90022]         1, 4. A1c has slightly worsened to 7.3 but he knows it is diet related. I discussed the importance of cutting back on the portions and carb loaded foods along with routine exercise. Will recheck in 3 months and if A1c still above 7, may need to consider a low dose sulfonylurea.     2. Stable, updated non-fasting CMP ordered. Continue lisinopril.     3. Stable, continue CPAP.    5. Continue simvastatin.    6. Related to dehydration. I recommend drinking 60-80 ounces of water daily to stay well hydrated. If not improving, he will let me know and could consider trial of simvastatin.    7. Vaccine administered by MA.    Follow up in 3 months.       Toy Martinez PA-C  [facility]

## 2018-11-27 RX ORDER — SIMVASTATIN 10 MG
TABLET ORAL
Qty: 90 TABLET | Refills: 0 | Status: SHIPPED | OUTPATIENT
Start: 2018-11-27 | End: 2018-11-29

## 2018-11-27 NOTE — TELEPHONE ENCOUNTER
"Requested Prescriptions   Pending Prescriptions Disp Refills     simvastatin (ZOCOR) 10 MG tablet [Pharmacy Med Name: SIMVASTATIN 10MG TABS] 90 tablet 3     Sig: TAKE ONE TABLET BY MOUTH AT BEDTIME    Statins Protocol Passed    11/26/2018  8:08 PM       Passed - LDL on file in past 12 months    Recent Labs   Lab Test  03/14/18   0743   LDL  28          Passed - No abnormal creatine kinase in past 12 months    No lab results found.        Passed - Recent (12 mo) or future (30 days) visit within the authorizing provider's specialty    Patient had office visit in the last 12 months or has a visit in the next 30 days with authorizing provider or within the authorizing provider's specialty.  See \"Patient Info\" tab in inbasket, or \"Choose Columns\" in Meds & Orders section of the refill encounter.         Passed - Patient is age 18 or older        simvastatin (ZOCOR) 10 MG tablet  Prescription approved per [facility] Refill Protocol.  Shayy Lagunas RN, BSN     "

## 2018-11-29 ENCOUNTER — OFFICE VISIT (OUTPATIENT)
Dept: FAMILY MEDICINE | Facility: OTHER | Age: 58
End: 2018-11-29
Payer: COMMERCIAL

## 2018-11-29 VITALS
BODY MASS INDEX: 44.71 KG/M2 | HEART RATE: 72 BPM | OXYGEN SATURATION: 98 % | SYSTOLIC BLOOD PRESSURE: 128 MMHG | DIASTOLIC BLOOD PRESSURE: 72 MMHG | WEIGHT: 295 LBS | RESPIRATION RATE: 14 BRPM | TEMPERATURE: 97.4 F

## 2018-11-29 DIAGNOSIS — E66.01 MORBID OBESITY (H): ICD-10-CM

## 2018-11-29 DIAGNOSIS — E11.40 TYPE 2 DIABETES MELLITUS WITH DIABETIC NEUROPATHY, WITHOUT LONG-TERM CURRENT USE OF INSULIN (H): Primary | ICD-10-CM

## 2018-11-29 DIAGNOSIS — I10 ESSENTIAL HYPERTENSION WITH GOAL BLOOD PRESSURE LESS THAN 140/90: ICD-10-CM

## 2018-11-29 DIAGNOSIS — Z23 NEED FOR PROPHYLACTIC VACCINATION AND INOCULATION AGAINST INFLUENZA: ICD-10-CM

## 2018-11-29 DIAGNOSIS — G47.33 OSA (OBSTRUCTIVE SLEEP APNEA): ICD-10-CM

## 2018-11-29 DIAGNOSIS — R25.2 BILATERAL LEG CRAMPS: ICD-10-CM

## 2018-11-29 DIAGNOSIS — E78.5 HYPERLIPIDEMIA LDL GOAL <100: ICD-10-CM

## 2018-11-29 LAB
ALBUMIN SERPL-MCNC: 3.8 G/DL (ref 3.4–5)
ALP SERPL-CCNC: 70 U/L (ref 40–150)
ALT SERPL W P-5'-P-CCNC: 29 U/L (ref 0–70)
ANION GAP SERPL CALCULATED.3IONS-SCNC: 11 MMOL/L (ref 3–14)
AST SERPL W P-5'-P-CCNC: 15 U/L (ref 0–45)
BILIRUB SERPL-MCNC: 0.3 MG/DL (ref 0.2–1.3)
BUN SERPL-MCNC: 15 MG/DL (ref 7–30)
CALCIUM SERPL-MCNC: 8.9 MG/DL (ref 8.5–10.1)
CHLORIDE SERPL-SCNC: 103 MMOL/L (ref 94–109)
CO2 SERPL-SCNC: 27 MMOL/L (ref 20–32)
CREAT SERPL-MCNC: 0.94 MG/DL (ref 0.66–1.25)
GFR SERPL CREATININE-BSD FRML MDRD: 82 ML/MIN/1.7M2
GLUCOSE SERPL-MCNC: 182 MG/DL (ref 70–99)
HBA1C MFR BLD: 7.3 % (ref 0–5.6)
POTASSIUM SERPL-SCNC: 3.9 MMOL/L (ref 3.4–5.3)
PROT SERPL-MCNC: 7.2 G/DL (ref 6.8–8.8)
SODIUM SERPL-SCNC: 141 MMOL/L (ref 133–144)

## 2018-11-29 PROCEDURE — 99214 OFFICE O/P EST MOD 30 MIN: CPT | Mod: 25 | Performed by: PHYSICIAN ASSISTANT

## 2018-11-29 PROCEDURE — 80053 COMPREHEN METABOLIC PANEL: CPT | Performed by: PHYSICIAN ASSISTANT

## 2018-11-29 PROCEDURE — 90682 RIV4 VACC RECOMBINANT DNA IM: CPT | Performed by: PHYSICIAN ASSISTANT

## 2018-11-29 PROCEDURE — 82043 UR ALBUMIN QUANTITATIVE: CPT | Performed by: PHYSICIAN ASSISTANT

## 2018-11-29 PROCEDURE — 90471 IMMUNIZATION ADMIN: CPT | Performed by: PHYSICIAN ASSISTANT

## 2018-11-29 PROCEDURE — 99207 C FOOT EXAM  NO CHARGE: CPT | Performed by: PHYSICIAN ASSISTANT

## 2018-11-29 PROCEDURE — 83036 HEMOGLOBIN GLYCOSYLATED A1C: CPT | Performed by: PHYSICIAN ASSISTANT

## 2018-11-29 PROCEDURE — 36415 COLL VENOUS BLD VENIPUNCTURE: CPT | Performed by: PHYSICIAN ASSISTANT

## 2018-11-29 RX ORDER — SIMVASTATIN 10 MG
TABLET ORAL
Qty: 90 TABLET | Refills: 3 | Status: SHIPPED | OUTPATIENT
Start: 2018-11-29 | End: 2019-03-04

## 2018-11-29 NOTE — MR AVS SNAPSHOT
After Visit Summary   11/29/2018    Chung Lincoln    MRN: [id number]           Patient Information     Date Of Birth          1960        Visit Information        Provider Department      11/29/2018 4:15 PM Toy Martinez PA-C [facility]        Today's Diagnoses     Type 2 diabetes mellitus with diabetic neuropathy, without long-term current use of insulin (H)    -  1    Essential hypertension with goal blood pressure less than 140/90        STEVEN (obstructive sleep apnea)        Morbid obesity (H)        Hyperlipidemia LDL goal <100        Bilateral leg cramps        Need for prophylactic vaccination and inoculation against influenza          Care Instructions    Continue current medications but try to cut back on the carb loaded, sugary foods and stay more active.    Try to drink at least 60-80 ounces of water per day to avoid dehydration and leg cramps.    Follow up in 3 months for a recheck.             Follow-ups after your visit        Follow-up notes from your care team     Return in about 3 months (around 2/28/2019) for Routine Visit.      Your next 10 appointments already scheduled     Feb 28, 2019  4:15 PM CST   [facility] Visit with Toy Martinez PA-C   [facility]    [address] [place] [id number] [phone]           Bring a current list of meds and any records pertaining to this visit. For Physicals, please bring immunization records and any forms needing to be filled out. Please arrive 10 minutes early to complete paperwork.              Who to contact     If you have questions or need follow up information about today's clinic visit or your schedule please contact [facility] directly at [phone].  Normal or non-critical lab and imaging results will be communicated to you by MyChart, letter or phone within 4 business days after the clinic has received the results. If you do  not hear from us within 7 days, please contact the clinic through [employer] or phone. If you have a critical or abnormal lab result, we will notify you by phone as soon as possible.  Submit refill requests through [employer] or call your pharmacy and they will forward the refill request to us. Please allow 3 business days for your refill to be completed.          Additional Information About Your Visit        [employer] Information     [employer] gives you secure access to your electronic health record. If you see a primary care provider, you can also send messages to your care team and make appointments. If you have questions, please call your primary care clinic.  If you do not have a primary care provider, please call [phone] and they will assist you.        Care EveryWhere ID     This is your Care EveryWhere ID. This could be used by other organizations to access your [employer] medical records  [id number]        Your Vitals Were     Pulse Temperature Respirations Pulse Oximetry BMI (Body Mass Index)       72 97.4  F (36.3  C) (Temporal) 14 98% 44.71 kg/m2        Blood Pressure from Last 3 Encounters:   11/29/18 128/72   05/10/18 126/80   02/15/18 136/84    Weight from Last 3 Encounters:   11/29/18 295 lb (133.8 kg)   05/10/18 292 lb (132.5 kg)   02/15/18 (!) 302 lb (137 kg)              We Performed the Following     Albumin Random Urine Quantitative with Creat Ratio     Comprehensive metabolic panel (BMP + Alb, Alk Phos, ALT, AST, Total. Bili, TP)     FLU VACCINE, (RIV4) RECOMBINANT HA  , IM (FluBlok, egg free) [55743]- >18 YRS ([facility] recommended  50-64 YRS)     FOOT EXAM  NO CHARGE [14072.114]     HEMOGLOBIN A1C     Vaccine Administration, Initial [30581]          Today's Medication Changes          These changes are accurate as of 11/29/18  4:35 PM.  If you have any questions, ask your nurse or doctor.               These medicines have changed or have updated prescriptions.        Dose/Directions    * metFORMIN 500  MG tablet   Commonly known as:  GLUCOPHAGE   This may have changed:  Another medication with the same name was added. Make sure you understand how and when to take each.   Used for:  Type 2 diabetes mellitus with diabetic neuropathy, without long-term current use of insulin (H)   Changed by:  Toy Martinez PA-C        TAKE TWO TABLETS BY MOUTH TWICE A DAY WITH MEALS   Quantity:  120 tablet   Refills:  0       * metFORMIN 1000 MG tablet   Commonly known as:  GLUCOPHAGE   This may have changed:  You were already taking a medication with the same name, and this prescription was added. Make sure you understand how and when to take each.   Used for:  Type 2 diabetes mellitus with diabetic neuropathy, without long-term current use of insulin (H)   Changed by:  Toy Martinez PA-C        Dose:  1000 mg   Take 1 tablet (1,000 mg) by mouth 2 times daily (with meals)   Quantity:  180 tablet   Refills:  1       * Notice:  This list has 2 medication(s) that are the same as other medications prescribed for you. Read the directions carefully, and ask your doctor or other care provider to review them with you.         Where to get your medicines      These medications were sent to [address]  [address] [facility] [address]     Phone:  [phone]     metFORMIN 1000 MG tablet    simvastatin 10 MG tablet                Primary Care Provider Office Phone # Fax #    Toy Martinez PA-C [phone]       [address]  [facility] [address]        Equal Access to Services     [facility]: Hadii adelaide alfordo Sonoah, waaxda luqadaha, qaybta kaalmada adealexanderyada, edmund johnson. So [facility] [phone].    ATENCIÓN: Si habla español, tiene a villegas disposición servicios gratuitos de asistencia lingüística. Llame al [phone].    We comply with applicable federal civil rights laws and [place] laws. We do not discriminate on the  basis of race, color, national origin, age, disability, sex, sexual orientation, or gender identity.            Thank you!     Thank you for choosing [facility]  for your care. Our goal is always to provide you with excellent care. Hearing back from our patients is one way we can continue to improve our services. Please take a few minutes to complete the written survey that you may receive in the mail after your visit with us. Thank you!             Your Updated Medication List - Protect others around you: Learn how to safely use, store and throw away your medicines at www.disposemymeds.org.          This list is accurate as of 11/29/18  4:35 PM.  Always use your most recent med list.                   Brand Name Dispense Instructions for use Diagnosis    aspirin 81 MG tablet    ASA     Take by mouth daily        blood glucose lancets standard    NO BRAND SPECIFIED    1 Box    Use to test blood sugar 1 times daily or as directed.    Type 2 diabetes mellitus without complication (H)       blood glucose monitoring meter device kit    NO BRAND SPECIFIED    1 kit    Use to test blood sugar 1 times daily or as directed.    Type 2 diabetes mellitus without complication (H)       blood glucose monitoring test strip    ACCU-CHEK SAMANTHA PLUS    100 strip    USE TO TEST BLOOD SUGARS TWICE DAILY OR AS DIRECTED    Type 2 diabetes mellitus with diabetic neuropathy, without long-term current use of insulin (H)       lisinopril 20 MG tablet    PRINIVIL/ZESTRIL    90 tablet    Take 1 tablet (20 mg) by mouth daily    Essential hypertension with goal blood pressure less than 140/90       * metFORMIN 500 MG tablet    GLUCOPHAGE    120 tablet    TAKE TWO TABLETS BY MOUTH TWICE A DAY WITH MEALS    Type 2 diabetes mellitus with diabetic neuropathy, without long-term current use of insulin (H)       * metFORMIN 1000 MG tablet    GLUCOPHAGE    180 tablet    Take 1 tablet (1,000 mg) by mouth 2 times daily (with meals)    Type  2 diabetes mellitus with diabetic neuropathy, without long-term current use of insulin (H)       order for DME     1    needs new CPAP machine    Hypersomnia with sleep apnea, unspecified       PRILOSEC OTC PO           simvastatin 10 MG tablet    ZOCOR    90 tablet    TAKE ONE TABLET BY MOUTH AT BEDTIME    Hyperlipidemia LDL goal <100       * Notice:  This list has 2 medication(s) that are the same as other medications prescribed for you. Read the directions carefully, and ask your doctor or other care provider to review them with you.

## 2018-11-29 NOTE — PATIENT INSTRUCTIONS
Continue current medications but try to cut back on the carb loaded, sugary foods and stay more active.    Try to drink at least 60-80 ounces of water per day to avoid dehydration and leg cramps.    Follow up in 3 months for a recheck.

## 2018-11-29 NOTE — PROGRESS NOTES
Injectable Influenza Immunization Documentation    1.  Is the person to be vaccinated sick today?   No    2. Does the person to be vaccinated have an allergy to a component   of the vaccine?   No  Egg Allergy Algorithm Link    3. Has the person to be vaccinated ever had a serious reaction   to influenza vaccine in the past?   No    4. Has the person to be vaccinated ever had Guillain-Barré syndrome?   No    Form completed by Chung Lincoln    Prior to injection verified patient identity using patient's name and date of birth. Hilaria Abreu, CMA

## 2018-11-30 LAB
CREAT UR-MCNC: 161 MG/DL
MICROALBUMIN UR-MCNC: 7 MG/L
MICROALBUMIN/CREAT UR: 4.28 MG/G CR (ref 0–17)

## 2019-01-14 ENCOUNTER — TRANSFERRED RECORDS (OUTPATIENT)
Dept: HEALTH INFORMATION MANAGEMENT | Facility: CLINIC | Age: 59
End: 2019-01-14

## 2019-01-14 LAB — RETINOPATHY: NEGATIVE

## 2019-02-25 NOTE — PROGRESS NOTES
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 He is eating 3 meals per day and has small snacks in between. He tries to eat a low carb diet but probably eats more potatoes and rice than he should.    Problem list and histories reviewed & adjusted, as indicated.  Additional history: none    ROS:  GENERAL: Denies fever, fatigue, weakness, weight gain, or weight loss.  CARDIOVASCULAR: Denies chest pain, shortness of breath, irregular heartbeats, palpitations, or edema.  RESPIRATORY: Denies cough, hemoptysis, and shortness of breath.  NEUROLOGIC: Denies headache, fainting, dizziness, memory loss, numbness, tingling, or seizures.  PSYCHIATRIC: Denies depression, anxiety, mood swings, and thoughts of suicide.    OBJECTIVE:     /86   Pulse 82   Temp 98  F (36.7  C) (Temporal)   Resp 16   Ht 1.727 m (5' 8\")   Wt 137.4 kg (303 lb)   SpO2 98%   BMI 46.07 kg/m    Body mass index is 46.07 kg/m .  GENERAL: healthy, alert and no distress  RESP: lungs clear to auscultation - no rales, rhonchi or wheezes  CV: regular rate and rhythm, normal S1 S2, no S3 or S4, no murmur, click or rub, no peripheral edema  NEURO: Normal strength and tone, mentation intact and speech normal  PSYCH: mentation appears normal, affect normal/bright    Lab Results   Component Value Date    A1C 6.9 03/04/2019    A1C 7.3 11/29/2018    A1C 6.5 05/10/2018    A1C 6.6 03/14/2018    A1C 7.2 02/15/2018 "       ASSESSMENT/PLAN:       ICD-10-CM    1. Type 2 diabetes mellitus with diabetic neuropathy, without long-term current use of insulin (H) E11.40 **A1C FUTURE 3mo     metFORMIN (GLUCOPHAGE) 1000 MG tablet   2. Essential hypertension with goal blood pressure less than 130/80 I10 lisinopril (PRINIVIL/ZESTRIL) 20 MG tablet   3. Hyperlipidemia LDL goal <100 E78.5 Lipid panel reflex to direct LDL Fasting     Lipid panel reflex to direct LDL Fasting     simvastatin (ZOCOR) 10 MG tablet   4. Screen for colon cancer Z12.11 Fecal colorectal cancer screen (FIT)       1. A1c has improved to 6.9. I recommend he continue to work on a healthier diet with less carbs and portion sizes. I also recommend exercise outside of work 4-5 days per week for at least 30-60 minutes. Will plan on recheck in 6 months.    2. BP is slightly above goal of <130/80 but I do not believe any medication changes are necessary. He will work on the above lifestyle changes and will recheck in 6 months.     3. Updated non-fasting lipid panel ordered.    4. Updated FIT testing ordered.      Toy Martinez PA-C  [facility]

## 2019-03-04 ENCOUNTER — OFFICE VISIT (OUTPATIENT)
Dept: FAMILY MEDICINE | Facility: OTHER | Age: 59
End: 2019-03-04
Payer: COMMERCIAL

## 2019-03-04 VITALS
HEIGHT: 68 IN | DIASTOLIC BLOOD PRESSURE: 86 MMHG | WEIGHT: 303 LBS | RESPIRATION RATE: 16 BRPM | SYSTOLIC BLOOD PRESSURE: 132 MMHG | BODY MASS INDEX: 45.92 KG/M2 | HEART RATE: 82 BPM | OXYGEN SATURATION: 98 % | TEMPERATURE: 98 F

## 2019-03-04 DIAGNOSIS — E78.5 HYPERLIPIDEMIA LDL GOAL <100: ICD-10-CM

## 2019-03-04 DIAGNOSIS — E11.40 TYPE 2 DIABETES MELLITUS WITH DIABETIC NEUROPATHY, WITHOUT LONG-TERM CURRENT USE OF INSULIN (H): Primary | ICD-10-CM

## 2019-03-04 DIAGNOSIS — I10 ESSENTIAL HYPERTENSION WITH GOAL BLOOD PRESSURE LESS THAN 130/80: ICD-10-CM

## 2019-03-04 DIAGNOSIS — Z12.11 SCREEN FOR COLON CANCER: ICD-10-CM

## 2019-03-04 LAB
CHOLEST SERPL-MCNC: 158 MG/DL
HBA1C MFR BLD: 6.9 % (ref 0–5.6)
HDLC SERPL-MCNC: 34 MG/DL
LDLC SERPL CALC-MCNC: 69 MG/DL
NONHDLC SERPL-MCNC: 124 MG/DL
TRIGL SERPL-MCNC: 274 MG/DL

## 2019-03-04 PROCEDURE — 36415 COLL VENOUS BLD VENIPUNCTURE: CPT | Performed by: PHYSICIAN ASSISTANT

## 2019-03-04 PROCEDURE — 80061 LIPID PANEL: CPT | Performed by: PHYSICIAN ASSISTANT

## 2019-03-04 PROCEDURE — 99214 OFFICE O/P EST MOD 30 MIN: CPT | Performed by: PHYSICIAN ASSISTANT

## 2019-03-04 PROCEDURE — 83036 HEMOGLOBIN GLYCOSYLATED A1C: CPT | Performed by: PHYSICIAN ASSISTANT

## 2019-03-04 RX ORDER — LISINOPRIL 20 MG/1
20 TABLET ORAL DAILY
Qty: 90 TABLET | Refills: 3 | Status: SHIPPED | OUTPATIENT
Start: 2019-03-04 | End: 2020-03-04

## 2019-03-04 RX ORDER — SIMVASTATIN 10 MG
TABLET ORAL
Qty: 90 TABLET | Refills: 3 | Status: SHIPPED | OUTPATIENT
Start: 2019-03-04 | End: 2020-02-18

## 2019-03-04 ASSESSMENT — MIFFLIN-ST. JEOR: SCORE: 2168.9

## 2019-03-04 NOTE — PATIENT INSTRUCTIONS
Continue current medications.  Work on a healthier low fat, low carb diet with routine exercise for at least 30 minutes, 4-5 days per week.    Follow up in 6 months

## 2019-03-05 DIAGNOSIS — E78.5 HYPERLIPIDEMIA LDL GOAL <100: ICD-10-CM

## 2019-03-05 RX ORDER — SIMVASTATIN 10 MG
TABLET ORAL
Qty: 90 TABLET | Refills: 0 | OUTPATIENT
Start: 2019-03-05

## 2019-03-05 NOTE — TELEPHONE ENCOUNTER
Simvastatin    Sent 3/4/2019 with 1 year supply. Refill not appropriate at this time.     Elin Moser, RN, BSN

## 2019-11-19 NOTE — PROGRESS NOTES
"Subjective     Chung Lincoln is a 59 year old male who presents to clinic today for the following health issues:    Patient reports he takes his blood sugars on occasion but states they never go above 180-190; he reports he hasn't been eating as well. He eats a lot of carb loaded foods and knows his portion sizes are too large. 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     He reports intermittent numbness and pain in his feet and is thinks it is related to pain in his low back but when he \"arches his back\" it can make the numbness go away. He sees chiropractor every other week which is helpful. He denies any shooting pain into his legs or any loss of bowel/bladder control.     Sometimes when he is sitting still will feel a \"thump\" in his chest, 1-3 at a time for a few seconds. He denies chest pain or tightness, lightheadedness, or shortness of breath. He does not drink coffee but drinks caffeinated Crystal Light daily.      History of Present Illness        He eats 2-3 servings of fruits and vegetables daily.He consumes 0 sweetened beverage(s) daily.  He is taking medications regularly.     Diabetes Follow-up      How often are you checking your blood sugar? Not very often    What concerns do you have today about your diabetes? None     Do you have any of these symptoms? (Select all that apply)  Numbness in feet and Burning in feet     Have you had a diabetic eye exam in the last 12 months? Yes- Date of last eye exam: 01/2019    BP Readings from Last 2 Encounters:   11/22/19 130/84   03/04/19 132/86     Hemoglobin A1C (%)   Date Value   11/22/2019 9.2 (H)   03/04/2019 6.9 (H)     LDL Cholesterol Calculated (mg/dL)   Date Value   03/04/2019 69   03/14/2018 28       Diabetes Management Resources    Hyperlipidemia Follow-Up      Are you having any of the following symptoms? (Select all that apply)  No complaints of shortness of breath, " "chest pain or pressure.  No increased sweating or nausea with activity.  No left-sided neck or arm pain.  No complaints of pain in calves when walking 1-2 blocks.    Are you regularly taking any medication or supplement to lower your cholesterol?   Yes- Simvastatin    Are you having muscle aches or other side effects that you think could be caused by your cholesterol lowering medication?  No    Hypertension Follow-up      Do you check your blood pressure regularly outside of the clinic? No     Are you following a low salt diet? Yes    Are your blood pressures ever more than 140 on the top number (systolic) OR more   than 90 on the bottom number (diastolic), for example 140/90? No    Reviewed and updated as needed this visit by Provider   Allergies  Meds  Problems    Review of Systems   ROS COMP: Constitutional, cardiovascular, pulmonary, GI, , musculoskeletal, neuro, endocrine and psych systems are negative, except as otherwise noted.      Objective    /84   Pulse 87   Temp 97.2  F (36.2  C) (Temporal)   Resp 18   Ht 1.727 m (5' 8\")   Wt 144.2 kg (318 lb)   SpO2 99%   BMI 48.35 kg/m    Body mass index is 48.35 kg/m .  Physical Exam   GENERAL: healthy, alert and no distress  EYES: Eyes grossly normal to inspection, PERRL and conjunctivae and sclerae normal  HENT: ear canals and TM's normal, nose and mouth without ulcers or lesions  NECK: no adenopathy, no asymmetry, masses, or scars and thyroid normal to palpation  RESP: lungs clear to auscultation - no rales, rhonchi or wheezes  CV: regular rate and rhythm, normal S1 S2, no S3 or S4, no murmur, click or rub, trace to 1+ bilateral pretibial edema  ABDOMEN: soft, nontender, no hepatosplenomegaly, no masses and bowel sounds normal  NEURO: Normal strength and tone, mentation intact and speech normal. Gait is stable.   PSYCH: mentation appears normal, affect normal/bright  Diabetic foot exam: normal DP and PT pulses, no trophic changes or ulcerative " lesions, normal sensory exam and normal monofilament exam, except for findings noted on diabetic foot graphical image.              Lab Results   Component Value Date    A1C 9.2 11/22/2019    A1C 6.9 03/04/2019    A1C 7.3 11/29/2018    A1C 6.5 05/10/2018    A1C 6.6 03/14/2018         Assessment & Plan       ICD-10-CM    1. Type 2 diabetes mellitus with diabetic neuropathy, without long-term current use of insulin (H) E11.40 HEMOGLOBIN A1C     BASIC METABOLIC PANEL     Albumin Random Urine Quantitative with Creat Ratio     FOOT EXAM     HEMOGLOBIN A1C     BASIC METABOLIC PANEL     Albumin Random Urine Quantitative with Creat Ratio     metFORMIN (GLUCOPHAGE) 1000 MG tablet     glimepiride (AMARYL) 1 MG tablet   2. Hyperlipidemia LDL goal <100 E78.5    3. Essential hypertension with goal blood pressure less than 130/80 I10 BASIC METABOLIC PANEL     BASIC METABOLIC PANEL   4. Morbid obesity (H) E66.01    5. Palpitations R00.2    6. Chronic bilateral low back pain without sciatica M54.5     G89.29         I had a long discussion with Chung regarding his worsening diabetes and have encouraged him to work on some lifestyle changes to help him lose weight, improve his diabetes, and decrease risk of future heart disease and improve his chronic low back pain. He does not seem too interested in exercising but states he will try to be more active in his job and will consider trying to take walks. I also recommend he cut back on the portion sizes and carb loaded foods and try to eat smaller, more frequent meals. Will add Amaryl 1 mg daily with breakfast for better glucose control and will continue with current dose of metformin. Will plan on recheck in 3 months.    He will continue with chiropractic treatment for his low back pain and weight loss will also help.     His intermittent palpitations are consistent with PVCs/PACs, likely due to stress and caffeine so I recommend he cut back on the caffeine. A healthier lifestyle as  noted above will help with his stress levels. I do not believe any testing is current warranted but if he has more frequents palpitations or any chest pain, shortness of breath, or lightheadedness, he should be seen again.       Patient seen in conjunction with KATERINE GarS    KATERINE LunaC  [facility]

## 2019-11-22 ENCOUNTER — OFFICE VISIT (OUTPATIENT)
Dept: FAMILY MEDICINE | Facility: OTHER | Age: 59
End: 2019-11-22
Payer: COMMERCIAL

## 2019-11-22 VITALS
HEIGHT: 68 IN | BODY MASS INDEX: 47.74 KG/M2 | DIASTOLIC BLOOD PRESSURE: 84 MMHG | WEIGHT: 315 LBS | SYSTOLIC BLOOD PRESSURE: 130 MMHG | OXYGEN SATURATION: 99 % | TEMPERATURE: 97.2 F | HEART RATE: 87 BPM | RESPIRATION RATE: 18 BRPM

## 2019-11-22 DIAGNOSIS — R00.2 PALPITATIONS: ICD-10-CM

## 2019-11-22 DIAGNOSIS — E78.5 HYPERLIPIDEMIA LDL GOAL <100: ICD-10-CM

## 2019-11-22 DIAGNOSIS — I10 ESSENTIAL HYPERTENSION WITH GOAL BLOOD PRESSURE LESS THAN 130/80: ICD-10-CM

## 2019-11-22 DIAGNOSIS — E66.01 MORBID OBESITY (H): ICD-10-CM

## 2019-11-22 DIAGNOSIS — M54.50 CHRONIC BILATERAL LOW BACK PAIN WITHOUT SCIATICA: ICD-10-CM

## 2019-11-22 DIAGNOSIS — E11.40 TYPE 2 DIABETES MELLITUS WITH DIABETIC NEUROPATHY, WITHOUT LONG-TERM CURRENT USE OF INSULIN (H): Primary | ICD-10-CM

## 2019-11-22 DIAGNOSIS — G89.29 CHRONIC BILATERAL LOW BACK PAIN WITHOUT SCIATICA: ICD-10-CM

## 2019-11-22 LAB
ANION GAP SERPL CALCULATED.3IONS-SCNC: 4 MMOL/L (ref 3–14)
BUN SERPL-MCNC: 17 MG/DL (ref 7–30)
CALCIUM SERPL-MCNC: 8.9 MG/DL (ref 8.5–10.1)
CHLORIDE SERPL-SCNC: 102 MMOL/L (ref 94–109)
CO2 SERPL-SCNC: 32 MMOL/L (ref 20–32)
CREAT SERPL-MCNC: 0.9 MG/DL (ref 0.66–1.25)
CREAT UR-MCNC: 111 MG/DL
GFR SERPL CREATININE-BSD FRML MDRD: >90 ML/MIN/{1.73_M2}
GLUCOSE SERPL-MCNC: 263 MG/DL (ref 70–99)
HBA1C MFR BLD: 9.2 % (ref 0–5.6)
MICROALBUMIN UR-MCNC: 6 MG/L
MICROALBUMIN/CREAT UR: 5.31 MG/G CR (ref 0–17)
POTASSIUM SERPL-SCNC: 3.9 MMOL/L (ref 3.4–5.3)
SODIUM SERPL-SCNC: 138 MMOL/L (ref 133–144)

## 2019-11-22 PROCEDURE — 80048 BASIC METABOLIC PNL TOTAL CA: CPT | Performed by: PHYSICIAN ASSISTANT

## 2019-11-22 PROCEDURE — 36415 COLL VENOUS BLD VENIPUNCTURE: CPT | Performed by: PHYSICIAN ASSISTANT

## 2019-11-22 PROCEDURE — 83036 HEMOGLOBIN GLYCOSYLATED A1C: CPT | Performed by: PHYSICIAN ASSISTANT

## 2019-11-22 PROCEDURE — 82043 UR ALBUMIN QUANTITATIVE: CPT | Performed by: PHYSICIAN ASSISTANT

## 2019-11-22 PROCEDURE — 99214 OFFICE O/P EST MOD 30 MIN: CPT | Performed by: PHYSICIAN ASSISTANT

## 2019-11-22 PROCEDURE — 99207 C FOOT EXAM  NO CHARGE: CPT | Mod: 25 | Performed by: PHYSICIAN ASSISTANT

## 2019-11-22 RX ORDER — GLIMEPIRIDE 1 MG/1
1 TABLET ORAL
Qty: 30 TABLET | Refills: 5 | Status: SHIPPED | OUTPATIENT
Start: 2019-11-22 | End: 2020-05-27

## 2019-11-22 ASSESSMENT — MIFFLIN-ST. JEOR: SCORE: 2231.94

## 2019-11-22 NOTE — PATIENT INSTRUCTIONS
Work on more routine exercise/physical activity for at least 20-30 minutes daily.  I also recommend cutting back on the portion sizes and the carb loaded foods as your diabetes has worsened.  Will add a medication called Amaryl to take in the mornings before breakfast.  If you have any side effects, let me know.    The heart palpitations are likely due to premature beats that are common with increased stress and caffeine.  If this become more frequent or you notice any chest pain or shortness of breath, let me know.     Follow up in 3 months for a recheck.

## 2019-12-08 ENCOUNTER — HEALTH MAINTENANCE LETTER (OUTPATIENT)
Age: 59
End: 2019-12-08

## 2020-02-18 DIAGNOSIS — E78.5 HYPERLIPIDEMIA LDL GOAL <100: ICD-10-CM

## 2020-02-18 RX ORDER — SIMVASTATIN 10 MG
TABLET ORAL
Qty: 90 TABLET | Refills: 0 | Status: SHIPPED | OUTPATIENT
Start: 2020-02-18 | End: 2020-03-04

## 2020-02-18 NOTE — TELEPHONE ENCOUNTER
Pending Prescriptions:                       Disp   Refills    SIMVASTATIN 10 MG PO tablet [Pharmacy Med*90 tab*0            Sig: TAKE ONE TABLET BY MOUTH AT BEDTIME    Next 5 appointments (look out 90 days)    Mar 04, 2020  3:45 PM CST  [facility]-C  [facility] [address] [address]  [phone]        Medication is being filled for 1 time refill only due to:  Patient needs to be seen because OV and fasting labs.     Elin Moser, MSN, RN

## 2020-02-25 NOTE — PROGRESS NOTES
"Subjective     Chung Lincoln is a 59 year old male who presents to clinic today for the following health issues:    History of Present Illness        Diabetes:   He presents for follow up of diabetes.  He is not checking blood glucose. He has no concerns regarding his diabetes at this time.  He is having burning in feet. The patient has not had a diabetic eye exam in the last 12 months.         He eats 4 or more servings of fruits and vegetables daily.He exercises with enough effort to increase his heart rate 9 or less minutes per day.    He is taking medications regularly.     He has been trying to eat lower carb meals and has lost some weight. He still is not getting any routine exercise besides walking around his shop.     Hyperlipidemia Follow-Up      Are you regularly taking any medication or supplement to lower your cholesterol?   Yes- Simvastatin    Are you having muscle aches or other side effects that you think could be caused by your cholesterol lowering medication?  No    Hypertension Follow-up      Do you check your blood pressure regularly outside of the clinic? No     Are you following a low salt diet? Yes    Are your blood pressures ever more than 140 on the top number (systolic) OR more   than 90 on the bottom number (diastolic), for example 140/90? No      Reviewed and updated as needed this visit by Provider  Allergies  Meds  Problems  Med Hx    Review of Systems   GENERAL: Denies fever, fatigue, weakness, weight gain, or weight loss.  CARDIOVASCULAR: Denies chest pain, shortness of breath, irregular heartbeats,  palpitations, or edema.  RESPIRATORY: Denies cough, hemoptysis, and shortness of breath.  NEUROLOGIC: Denies headache, fainting, dizziness, memory loss, new numbness, tingling, or seizures.  PSYCHIATRIC: Denies depression, anxiety, mood swings, and thoughts of suicide.      Objective    /86   Pulse 80   Temp 97.8  F (36.6  C) (Temporal)   Resp 16   Ht 1.727 m (5' 8\")   Wt " 142.9 kg (315 lb)   SpO2 97%   BMI 47.90 kg/m    Body mass index is 47.9 kg/m .  Physical Exam   GENERAL: healthy, alert and no distress  RESP: lungs clear to auscultation - no rales, rhonchi or wheezes  CV: regular rate and rhythm, normal S1 S2, no S3 or S4, no murmur, click or rub, no peripheral edema  NEURO: Normal strength and tone, mentation intact and speech normal. Gait is stable.   PSYCH: mentation appears normal, affect normal/bright    Lab Results   Component Value Date    A1C 7.4 03/04/2020    A1C 9.2 11/22/2019    A1C 6.9 03/04/2019    A1C 7.3 11/29/2018    A1C 6.5 05/10/2018           Assessment & Plan       ICD-10-CM    1. Type 2 diabetes mellitus with diabetic neuropathy, without long-term current use of insulin (H) E11.40 Hemoglobin A1c     Hemoglobin A1c   2. Hyperlipidemia LDL goal <100 E78.5 simvastatin (ZOCOR) 10 MG tablet     Lipid panel reflex to direct LDL Fasting     CANCELED: Lipid panel reflex to direct LDL Fasting   3. Essential hypertension with goal blood pressure less than 130/80 I10 lisinopril (ZESTRIL) 20 MG tablet   4. Colon cancer screening Z12.11 Fecal colorectal cancer screen (FIT)       1. A1c has improved to 7.4 from 9.2 back in November so he is going in the right direction. Will continue current doses of metformin and Amaryl and I again discussed the importance of more routine exercise/walking outdoors along with continuing to work on a low carb diet with decreased portion sizes. Will plan on recheck in 3 months.    2. Updated non-fasting lipid panel ordered. Continue with current dose of simvastatin.    3. Stable, continue current dose of lisinopril.    4. He will complete FIT testing.       Toy Martinez PA-C  [facility]

## 2020-03-04 ENCOUNTER — OFFICE VISIT (OUTPATIENT)
Dept: FAMILY MEDICINE | Facility: OTHER | Age: 60
End: 2020-03-04
Payer: COMMERCIAL

## 2020-03-04 VITALS
DIASTOLIC BLOOD PRESSURE: 86 MMHG | OXYGEN SATURATION: 97 % | RESPIRATION RATE: 16 BRPM | TEMPERATURE: 97.8 F | HEART RATE: 80 BPM | WEIGHT: 315 LBS | HEIGHT: 68 IN | SYSTOLIC BLOOD PRESSURE: 132 MMHG | BODY MASS INDEX: 47.74 KG/M2

## 2020-03-04 DIAGNOSIS — E78.5 HYPERLIPIDEMIA LDL GOAL <100: ICD-10-CM

## 2020-03-04 DIAGNOSIS — E11.40 TYPE 2 DIABETES MELLITUS WITH DIABETIC NEUROPATHY, WITHOUT LONG-TERM CURRENT USE OF INSULIN (H): Primary | ICD-10-CM

## 2020-03-04 DIAGNOSIS — Z12.11 COLON CANCER SCREENING: ICD-10-CM

## 2020-03-04 DIAGNOSIS — I10 ESSENTIAL HYPERTENSION WITH GOAL BLOOD PRESSURE LESS THAN 130/80: ICD-10-CM

## 2020-03-04 LAB
CHOLEST SERPL-MCNC: 131 MG/DL
HBA1C MFR BLD: 7.4 % (ref 0–5.6)
HDLC SERPL-MCNC: 35 MG/DL
LDLC SERPL CALC-MCNC: 55 MG/DL
NONHDLC SERPL-MCNC: 96 MG/DL
TRIGL SERPL-MCNC: 205 MG/DL

## 2020-03-04 PROCEDURE — 36415 COLL VENOUS BLD VENIPUNCTURE: CPT | Performed by: PHYSICIAN ASSISTANT

## 2020-03-04 PROCEDURE — 83036 HEMOGLOBIN GLYCOSYLATED A1C: CPT | Performed by: PHYSICIAN ASSISTANT

## 2020-03-04 PROCEDURE — 80061 LIPID PANEL: CPT | Performed by: PHYSICIAN ASSISTANT

## 2020-03-04 PROCEDURE — 99214 OFFICE O/P EST MOD 30 MIN: CPT | Performed by: PHYSICIAN ASSISTANT

## 2020-03-04 RX ORDER — SIMVASTATIN 10 MG
10 TABLET ORAL AT BEDTIME
Qty: 90 TABLET | Refills: 3 | Status: SHIPPED | OUTPATIENT
Start: 2020-03-04 | End: 2021-02-15

## 2020-03-04 RX ORDER — LISINOPRIL 20 MG/1
20 TABLET ORAL DAILY
Qty: 90 TABLET | Refills: 3 | Status: SHIPPED | OUTPATIENT
Start: 2020-03-04 | End: 2021-02-15

## 2020-03-04 ASSESSMENT — MIFFLIN-ST. JEOR: SCORE: 2218.33

## 2020-03-04 NOTE — PATIENT INSTRUCTIONS
Your A1c has improved to 7.4 which is great!  Continue to work on more active lifestyle with longer walks along lower carb meals.  Will plan on recheck in 3 months.    Talk with insurance about the shingles vaccines.     Send in the FIT testing for colon cancer screening.

## 2020-05-26 DIAGNOSIS — E11.40 TYPE 2 DIABETES MELLITUS WITH DIABETIC NEUROPATHY, WITHOUT LONG-TERM CURRENT USE OF INSULIN (H): ICD-10-CM

## 2020-05-27 RX ORDER — GLIMEPIRIDE 1 MG/1
TABLET ORAL
Qty: 30 TABLET | Refills: 0 | Status: SHIPPED | OUTPATIENT
Start: 2020-05-27 | End: 2020-06-03

## 2020-05-27 NOTE — TELEPHONE ENCOUNTER
Pending Prescriptions:                       Disp   Refills    metFORMIN (GLUCOPHAGE) 1000 MG tablet [Pha*180 ta*1        Sig: TAKE ONE TABLET BY MOUTH TWICE A DAY WITH MEALS    glimepiride (AMARYL) 1 MG tablet [Pharmacy*30 tab*5        Sig: TAKE ONE TABLET BY MOUTH EVERY MORNING BEFORE           BREAKFAST    Patient is due for Dm F/U. Please call and schedule for video visit before the patient runs out of the medication. If they do not have enough to make it to their video visit, send back to the RN. Indicate quantity that patient will need to make it to their appointment.    Elin Moser, MSN, RN

## 2020-05-29 NOTE — PROGRESS NOTES
"Chung Lincoln is a 60 year old male who is being evaluated via a billable video visit.      The patient has been notified of following:     \"This video visit will be conducted via a call between you and your physician/provider. We have found that certain health care needs can be provided without the need for an in-person physical exam.  This service lets us provide the care you need with a video conversation.  If a prescription is necessary we can send it directly to your pharmacy.  If lab work is needed we can place an order for that and you can then stop by our lab to have the test done at a later time.    Video visits are billed at different rates depending on your insurance coverage.  Please reach out to your insurance provider with any questions.    If during the course of the call the physician/provider feels a video visit is not appropriate, you will not be charged for this service.\"    Patient has given verbal consent for Video visit? {YES-NO  Default Yes:4444::\"Yes\"}    How would you like to obtain your AVS? {AVS Preference:831936}    Patient would like the video invitation sent by: {video visit invitation:910651}    Will anyone else be joining your video visit? {:754501}  {If patient encounters technical issues they should call [phone] :700410}    Subjective     Chung Lincoln is a 60 year old male who presents today via video visit for the following health issues:    HPI  {SUPERLIST (Optional):812857}  {PEDS Chronic and Acute Problems (Optional):704865}     Video Start Time: {video visit start/end time for provider to select:075141}    {additonal problems for provider to add (Optional):098969}    {HIST REVIEW/ LINKS 2 (Optional):943318}    Reviewed and updated as needed this visit by Provider         Review of Systems   {ROS COMP (Optional):702098}      Objective    There were no vitals taken for this visit.  Estimated body mass index is 47.9 kg/m  as calculated from the following:    Height as of " "3/4/20: 1.727 m (5' 8\").    Weight as of 3/4/20: 142.9 kg (315 lb).  Physical Exam     {video visit exam brief selected:454697::\"GENERAL: Healthy, alert and no distress\",\"EYES: Eyes grossly normal to inspection.  No discharge or erythema, or obvious scleral/conjunctival abnormalities.\",\"RESP: No audible wheeze, cough, or visible cyanosis.  No visible retractions or increased work of breathing.  \",\"SKIN: Visible skin clear. No significant rash, abnormal pigmentation or lesions.\",\"NEURO: Cranial nerves grossly intact.  Mentation and speech appropriate for age.\",\"PSYCH: Mentation appears normal, affect normal/bright, judgement and insight intact, normal speech and appearance well-groomed.\"}      {Diagnostic Test Results (Optional):647639::\"Diagnostic Test Results:\",\"Labs reviewed in Epic\"}        {PROVIDER CHARTING PREFERENCE:726022}      Video-Visit Details    Type of service:  Video Visit    Video End Time:{video visit start/end time for provider to select:102335}    Originating Location (pt. Location): {video visit patient location:909148::\"Home\"}    Distant Location (provider location):  [facility]     Platform used for Video Visit: {Virtual Visit Platforms:479577::\"Splunk\"}    No follow-ups on file.       {signature options:812890}        "

## 2020-06-03 ENCOUNTER — VIRTUAL VISIT (OUTPATIENT)
Dept: FAMILY MEDICINE | Facility: OTHER | Age: 60
End: 2020-06-03
Payer: COMMERCIAL

## 2020-06-03 DIAGNOSIS — E11.40 TYPE 2 DIABETES MELLITUS WITH DIABETIC NEUROPATHY, WITHOUT LONG-TERM CURRENT USE OF INSULIN (H): Primary | ICD-10-CM

## 2020-06-03 DIAGNOSIS — E66.01 MORBID OBESITY (H): ICD-10-CM

## 2020-06-03 DIAGNOSIS — E78.5 HYPERLIPIDEMIA LDL GOAL <100: ICD-10-CM

## 2020-06-03 DIAGNOSIS — I10 ESSENTIAL HYPERTENSION WITH GOAL BLOOD PRESSURE LESS THAN 130/80: ICD-10-CM

## 2020-06-03 PROCEDURE — 99214 OFFICE O/P EST MOD 30 MIN: CPT | Mod: GT | Performed by: PHYSICIAN ASSISTANT

## 2020-06-03 RX ORDER — GLIMEPIRIDE 1 MG/1
TABLET ORAL
Qty: 90 TABLET | Refills: 1 | Status: SHIPPED | OUTPATIENT
Start: 2020-06-03 | End: 2020-09-23

## 2020-06-03 NOTE — PROGRESS NOTES
"Chung Lincoln is a 60 year old male who is being evaluated via a billable video visit.      The patient has been notified of following:     \"This video visit will be conducted via a call between you and your physician/provider. We have found that certain health care needs can be provided without the need for an in-person physical exam.  This service lets us provide the care you need with a video conversation.  If a prescription is necessary we can send it directly to your pharmacy.  If lab work is needed we can place an order for that and you can then stop by our lab to have the test done at a later time.    Video visits are billed at different rates depending on your insurance coverage.  Please reach out to your insurance provider with any questions.    If during the course of the call the physician/provider feels a video visit is not appropriate, you will not be charged for this service.\"    Patient has given verbal consent for Video visit? Yes    How would you like to obtain your AVS? MaribethFlomaton    Patient would like the video invitation sent by: Text to cell phone: [phone]    Will anyone else be joining your video visit? No    Subjective     Chung Lincoln is a 60 year old male who presents today via video visit for the following health issues:    HPI     Diabetes Follow-up  How often are you checking your blood sugar? A few times a week  What time of day are you checking your blood sugars (select all that apply)?  Before meals  Have you had any blood sugars above 200?  No  Have you had any blood sugars below 70?  No    What symptoms do you notice when your blood sugar is low?  Not applicable - he has only had one low blood sugar and he felt weak.     What concerns do you have today about your diabetes? None     Do you have any of these symptoms? (Select all that apply)  Numbness in feet and Burning in feet    Have you had a diabetic eye exam in the last 12 months? Yes- Date of last eye exam: 01/2019,  " Location: unsure    He has not been checking his glucose but has not changed his diet. He stays busy with physical activity in his shop and around the yard. He feels his weight has been stable.    Hyperlipidemia Follow-Up    Are you regularly taking any medication or supplement to lower your cholesterol?   Yes- Simvastatin     Are you having muscle aches or other side effects that you think could be caused by your cholesterol lowering medication?  No    Hypertension Follow-up    Do you check your blood pressure regularly outside of the clinic? No     Are you following a low salt diet? Yes    Are your blood pressures ever more than 140 on the top number (systolic) OR more   than 90 on the bottom number (diastolic), for example 140/90? No    BP Readings from Last 2 Encounters:   03/04/20 132/86   11/22/19 130/84     Hemoglobin A1C (%)   Date Value   03/04/2020 7.4 (H)   11/22/2019 9.2 (H)     LDL Cholesterol Calculated (mg/dL)   Date Value   03/04/2020 55   03/04/2019 69       How many servings of fruits and vegetables do you eat daily?  4 or more    On average, how many sweetened beverages do you drink each day (Examples: soda, juice, sweet tea, etc.  Do NOT count diet or artificially sweetened beverages)?   0    How many days per week do you exercise enough to make your heart beat faster? 3 or less    How many minutes a day do you exercise enough to make your heart beat faster? 9 or less    How many days per week do you miss taking your medication? 0    Video Start Time: 4:12 pm    Reviewed and updated as needed this visit by Provider   Allergies  Meds  Problems  Med Hx    Review of Systems   GENERAL: Denies fever, fatigue, weakness, weight gain, or weight loss.  CARDIOVASCULAR: Denies chest pain, shortness of breath, irregular heartbeats, palpitations, or edema.  RESPIRATORY: Denies cough, hemoptysis, and shortness of breath.  NEUROLOGIC: Denies headache, fainting, dizziness, memory loss, numbness, tingling, or  "seizures.  PSYCHIATRIC: Denies depression, anxiety, mood swings, and thoughts of suicide.       Objective    There were no vitals taken for this visit.  Estimated body mass index is 47.9 kg/m  as calculated from the following:    Height as of 3/4/20: 1.727 m (5' 8\").    Weight as of 3/4/20: 142.9 kg (315 lb).  Physical Exam     GENERAL: Healthy, alert and no distress  EYES: Eyes grossly normal to inspection.  No discharge or erythema, or obvious scleral/conjunctival abnormalities.  RESP: No audible wheeze, cough, or visible cyanosis.  No visible retractions or increased work of breathing.    SKIN: Visible skin clear. No significant rash, abnormal pigmentation or lesions.  NEURO: Cranial nerves grossly intact.  Mentation and speech appropriate for age.  PSYCH: Mentation appears normal, affect normal/bright, judgement and insight intact, normal speech and appearance well-groomed.    Lab Results   Component Value Date    A1C 7.4 03/04/2020    A1C 9.2 11/22/2019    A1C 6.9 03/04/2019    A1C 7.3 11/29/2018    A1C 6.5 05/10/2018        Assessment & Plan       ICD-10-CM    1. Type 2 diabetes mellitus with diabetic neuropathy, without long-term current use of insulin (H)  E11.40 glimepiride (AMARYL) 1 MG tablet   2. Morbid obesity (H)  E66.01    3. Essential hypertension with goal blood pressure less than 130/80  I10    4. Hyperlipidemia LDL goal <100  E78.5         1-2. I discussed the importance of at least checking daily fasting glucose readings to see if his diabetic control has changed. I recommend he continue to stay active and consider going on frequent walks. I also recommend he cut back on the carbs and portion sizes. Will continue current doses of Amaryl and metformin and will plan on clinic follow up with updated labs in 3 months.    3. Continue lisinopril.     4. Continue simvastatin.    Follow up in 3 months.       Toy Martinez PA-C  [facility]      Video-Visit Details    Type of service:  " Video Visit    Video End Time: 4:28 pm    Originating Location (pt. Location): Home    Distant Location (provider location):  [facility]     Platform used for Video Visit: Christi Martinez PA-C

## 2020-06-03 NOTE — PATIENT INSTRUCTIONS
Continue current medications.    Work on cutting back on the carbs and portion sizes along with more routine exercise (going for walks).    Follow up in 3 months for a recheck.

## 2020-06-03 NOTE — PROGRESS NOTES
"Chung Lincoln is a 60 year old male who is being evaluated via a billable telephone visit.      The patient has been notified of following:     \"This telephone visit will be conducted via a call between you and your physician/provider. We have found that certain health care needs can be provided without the need for a physical exam.  This service lets us provide the care you need with a short phone conversation.  If a prescription is necessary we can send it directly to your pharmacy.  If lab work is needed we can place an order for that and you can then stop by our lab to have the test done at a later time.    Telephone visits are billed at different rates depending on your insurance coverage. During this emergency period, for some insurers they may be billed the same as an in-person visit.  Please reach out to your insurance provider with any questions.    If during the course of the call the physician/provider feels a telephone visit is not appropriate, you will not be charged for this service.\"    Patient has given verbal consent for Telephone visit?  Yes     What phone number would you like to be contacted at? [phone] Home phone     How would you like to obtain your AVS? Mail a copy    Subjective     Chung Lincoln is a 60 year old male who presents via phone visit today for the following health issues:    HPI  Diabetes Follow-up      How often are you checking your blood sugar? Not at all    What concerns do you have today about your diabetes? None     Do you have any of these symptoms? (Select all that apply)  No numbness or tingling in feet.  No redness, sores or blisters on feet.  No complaints of excessive thirst.  No reports of blurry vision.  No significant changes to weight.    Have you had a diabetic eye exam in the last 12 months? No        {Reference  Diabetes Management Resources :274824}    {Reference  Diabetes Log - 7 days :711343}    Hyperlipidemia Follow-Up      Are you regularly taking " "any medication or supplement to lower your cholesterol?   Yes- ***simvastatin     Are you having muscle aches or other side effects that you think could be caused by your cholesterol lowering medication?  Yes- ***    Hypertension Follow-up      Do you check your blood pressure regularly outside of the clinic? No     Are you following a low salt diet? Yes    Are your blood pressures ever more than 140 on the top number (systolic) OR more   than 90 on the bottom number (diastolic), for example 140/90? No    BP Readings from Last 2 Encounters:   03/04/20 132/86   11/22/19 130/84     Hemoglobin A1C (%)   Date Value   03/04/2020 7.4 (H)   11/22/2019 9.2 (H)     LDL Cholesterol Calculated (mg/dL)   Date Value   03/04/2020 55   03/04/2019 69         {PEDS Chronic and Acute Problems (Optional):704281}     {additonal problems for provider to add (Optional):829836}    {HIST REVIEW/ LINKS 2 (Optional):011660}    Reviewed and updated as needed this visit by Provider         Review of Systems   {ROS COMP (Optional):862534}       Objective   Reported vitals:  There were no vitals taken for this visit.   {GENERAL APPEARANCE:50::\"healthy\",\"alert\",\"no distress\"}  PSYCH: Alert and oriented times 3; coherent speech, normal   rate and volume, able to articulate logical thoughts, able   to abstract reason, no tangential thoughts, no hallucinations   or delusions  His affect is { :4007092::\"normal\"}  RESP: No cough, no audible wheezing, able to talk in full sentences  Remainder of exam unable to be completed due to telephone visits    {Diagnostic Test Results (Optional):631602::\"Diagnostic Test Results:\",\"Labs reviewed in Epic\"}        Assessment/Plan:  {Diagnosis, Associated Orders and Comment:263013}    No follow-ups on file.      Phone call duration:  *** minutes    {signature options:597232}        "

## 2020-06-19 DIAGNOSIS — E11.40 TYPE 2 DIABETES MELLITUS WITH DIABETIC NEUROPATHY, WITHOUT LONG-TERM CURRENT USE OF INSULIN (H): ICD-10-CM

## 2020-06-19 RX ORDER — BLOOD SUGAR DIAGNOSTIC
STRIP MISCELLANEOUS
Qty: 100 STRIP | Refills: 5 | Status: SHIPPED | OUTPATIENT
Start: 2020-06-19 | End: 2021-05-19

## 2020-06-19 NOTE — TELEPHONE ENCOUNTER
Reason for Call:  Medication or medication refill:    Do you use a [facility]?  Name of the pharmacy and phone number for the current request: [facility] - [place], [place] - [address]     Name of the medication requested: blood glucose monitoring (ACCU-CHEK SAMANTHA PLUS) test strip     Other request: Pt called and is requesting a refill of this medication. Please advise thank you    Can we leave a detailed message on this number? YES    Phone number patient can be reached at: Home number on file [phone] (home)    Best Time: anytime    Call taken on 6/19/2020 at 8:51 AM by Chely Miramontes

## 2020-07-13 ENCOUNTER — OFFICE VISIT (OUTPATIENT)
Dept: FAMILY MEDICINE | Facility: OTHER | Age: 60
End: 2020-07-13
Payer: COMMERCIAL

## 2020-07-13 VITALS
DIASTOLIC BLOOD PRESSURE: 84 MMHG | BODY MASS INDEX: 48.2 KG/M2 | RESPIRATION RATE: 14 BRPM | TEMPERATURE: 97.7 F | WEIGHT: 315 LBS | HEART RATE: 76 BPM | SYSTOLIC BLOOD PRESSURE: 128 MMHG
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
## 2020-07-13 DIAGNOSIS — S30.860A TICK BITE OF BACK, INITIAL ENCOUNTER: Primary | ICD-10-CM

## 2020-07-13 DIAGNOSIS — I49.3 PVC'S (PREMATURE VENTRICULAR CONTRACTIONS): ICD-10-CM

## 2020-07-13 DIAGNOSIS — L03.312 CELLULITIS OF BACK EXCEPT BUTTOCK: ICD-10-CM

## 2020-07-13 PROCEDURE — 99213 OFFICE O/P EST LOW 20 MIN: CPT | Performed by: PHYSICIAN ASSISTANT

## 2020-07-13 RX ORDER — DOXYCYCLINE 100 MG/1
100 CAPSULE ORAL 2 TIMES DAILY
Qty: 20 CAPSULE | Refills: 0 | Status: SHIPPED | OUTPATIENT
Start: 2020-07-13 | End: 2020-09-23

## 2020-07-13 ASSESSMENT — PAIN SCALES - GENERAL: PAINLEVEL: NO PAIN (0)

## 2020-07-13 NOTE — PATIENT INSTRUCTIONS
This is not lyme disease but it does look like a local infection.  Will treat with 10 days of doxycycline.  Keep area clean.    Follow up in 2 months as scheduled.

## 2020-07-13 NOTE — PROGRESS NOTES
Subjective     Chung Lincoln is a 60 year old male who presents to clinic today for the following health issues:    HPI       Tick Bite      Duration: 2-3 weeks    Description (location/character/radiation): Patient had tick on his left side of back near shoulder roughly 2-3 weeks ago. This morning he noticed redness in the area where he took the tick off with some scabbing near the area, noticed a small amount of skin come off with the tick when he took it off    Intensity:  mild    Accompanying signs and symptoms: None       He pulled a tick off of his back approximately 2-3 weeks ago. He states it was a larger tick with a brown spot. He was able to remove the whole thing and it never bothered him but this morning he noticed a big red [place] around the bite and wants to make sure it is nothing to worry about. He denies pain, fever, chills, or body aches. He states the tick was not attached for more than 15-20 minutes.     He notices occasional heart palpitations and states he captured a few on a home 1-2 lead EKG machine that syncs with his phone. He denies associated chest pain or shortness of breath.    Reviewed and updated as needed this visit by Provider  Allergies  Meds  Problems     Review of Systems   GENERAL: Denies fever, fatigue, weakness, weight gain, or weight loss.  CARDIOVASCULAR: +intermittent palpitations. Denies chest pain, shortness of breath, irregular heartbeats, or edema.  RESPIRATORY: Denies cough, hemoptysis, and shortness of breath.  SKIN: +Red [place] around tick bite over left upper back.      Objective    /84   Pulse 76   Temp 97.7  F (36.5  C) (Temporal)   Resp 14   Wt 143.8 kg (317 lb)   BMI 48.20 kg/m    Body mass index is 48.2 kg/m .  Physical Exam   GENERAL: healthy, alert and no distress  RESP: lungs clear to auscultation - no rales, rhonchi or wheezes  CV: regular rate and rhythm, normal S1 S2, no S3 or S4, no murmur, click or rub  SKIN: ~2-3 cm of erythema with mild  warmth and no tenderness over left upper back. Mild central scabbing.       Assessment & Plan     ICD-10-CM    1. Tick bite of back, initial encounter  S30.860A     W57.XXXA    2. Cellulitis of back except buttock  L03.312 doxycycline hyclate (VIBRAMYCIN) 100 MG capsule   3. PVC's (premature ventricular contractions)  I49.3        1-2. It sounds like the tick was a deer tick based on his description and even if it was a deer tick, it was not attached long enough to transmit Lyme. It appears to be local cellulitis so will treat with doxycycline twice daily for 10 days. If he notices any spreading redness or fever, he will contact the clinic.    3. Intermittent PVCs noted on 1-2 lead EKG he showed me on his phone. These do no bother him. I recommend he avoid excess caffeine and will continue to monitor.    Follow up in 2 months as scheduled.         Toy Martinez PA-C  [facility]

## 2020-09-14 NOTE — TELEPHONE ENCOUNTER
Routing refill request to provider for review/approval because:  Script is from 6/2018    Next 5 appointments (look out 90 days)    Sep 16, 2020  4:15 PM CDT  Office Visit with Toy Martinez PA-C  [facility] ([facility]) [address] [place] [id number]  [phone]        Josie Tejada, RN, BSN           Detail Level: Zone Hide Include Location In Plan Question?: No

## 2020-09-18 NOTE — PROGRESS NOTES
Subjective     Chung Lincoln is a 60 year old male who presents to clinic today for the following health issues:    History of Present Illness        Diabetes:   He presents for follow up of diabetes.  He is checking home blood glucose a few times a month. He checks blood glucose before meals.  Blood glucose is never over 200 and never under 70. He is aware of hypoglycemia symptoms including weakness. He has no concerns regarding his diabetes at this time.  He is having numbness in feet. The patient has not had a diabetic eye exam in the last 12 months.         He eats 2-3 servings of fruits and vegetables daily.He exercises with enough effort to increase his heart rate 10 to 19 minutes per day.    He is taking medications regularly.       He states he has not been doing as well as he should with his diabetes. His glucose is often between 170-180 when he tests. He is not getting any routine exercise.    He had a left mastoidectomy years ago and has muffled hearing in the left ear with frequent cerumen discharge. 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 He denies any pain.     Review of Systems   Constitutional, HEENT, cardiovascular, pulmonary, gi and gu systems are negative, except as otherwise noted.      Objective    BP (!) 162/74   Pulse 78   Temp 97.6  F (36.4  C) (Temporal)   Resp 16   Wt 143.3 kg (316 lb)   SpO2 98%   BMI 48.05 kg/m    Body mass index is 48.05 kg/m .  Physical Exam   GENERAL: healthy, alert and no distress  HENT: ear canals and TM's normal, nose and mouth without ulcers or lesions  NECK: no adenopathy, no asymmetry, masses, or scars and thyroid normal to palpation  RESP: lungs clear to auscultation - no rales, rhonchi or wheezes  CV: regular rate and rhythm, normal S1 S2, no S3 or S4, no murmur, click or rub, no peripheral edema and peripheral pulses strong  NEURO: Normal strength and tone, mentation intact and speech normal. Gait is stable.   PSYCH: mentation appears normal, affect  normal/bright  Diabetic foot exam: normal DP and PT pulses, no trophic changes or ulcerative lesions, normal sensory exam and normal monofilament exam with the exception of decreased sensation over areas 1, 2, 5 on the right and 1-3 on the left.           Lab Results   Component Value Date    A1C 7.4 03/04/2020    A1C 9.2 11/22/2019    A1C 6.9 03/04/2019    A1C 7.3 11/29/2018    A1C 6.5 05/10/2018           Assessment & Plan       ICD-10-CM    1. Type 2 diabetes mellitus with diabetic neuropathy, without long-term current use of insulin (H)  E11.40 HEMOGLOBIN A1C     glimepiride (AMARYL) 2 MG tablet     metFORMIN (GLUCOPHAGE) 1000 MG tablet     Basic metabolic panel  (Ca, Cl, CO2, Creat, Gluc, K, Na, BUN)     Albumin Random Urine Quantitative with Creat Ratio   2. Essential hypertension with goal blood pressure less than 130/80  I10 Basic metabolic panel  (Ca, Cl, CO2, Creat, Gluc, K, Na, BUN)   3. Morbid obesity (H)  E66.01    4. History of left mastoidectomy  Z90.89    5. Impacted cerumen of left ear  H61.22    6. Colon cancer screening  Z12.11 Fecal colorectal cancer screen (FIT)   7. Need for prophylactic vaccination and inoculation against influenza  Z23 INFLUENZA QUAD, RECOMBINANT, P-FREE (RIV4) (FLUBLOCK) [12329]     Vaccine Administration, Initial [12187]          1, 2. Glucose has been higher per patient and he is not eating as well. Encouraged to cut back on the carbs and try to get more exercise (walking, yard work, etc). Will increase Amaryl to 2 mg daily and will check updated A1c today. Will plan on recheck in 3 months.    2. BP elevated today but he states it was a stressful day. He usually has well controlled BP. Will recheck in 1 week. Continue current dose of lisinopril.    4-5. Impacted cerumen of left ear with history of mastoidectomy. Unable to visualize TM and only able to remove a small amount with a cerumen spoon. Growth of left ear canal appreciated. Recommend follow-up with ENT.    6. He  will send in his FIT testing.    7. Influenza vaccine administered by MA.     Follow up in 3 months.    Toy Martinez PA-C  [facility]

## 2020-09-23 ENCOUNTER — OFFICE VISIT (OUTPATIENT)
Dept: FAMILY MEDICINE | Facility: OTHER | Age: 60
End: 2020-09-23
Payer: COMMERCIAL

## 2020-09-23 VITALS
DIASTOLIC BLOOD PRESSURE: 74 MMHG | TEMPERATURE: 97.6 F | HEART RATE: 78 BPM | RESPIRATION RATE: 16 BRPM | BODY MASS INDEX: 48.05 KG/M2 | SYSTOLIC BLOOD PRESSURE: 162 MMHG | OXYGEN SATURATION: 98 % | WEIGHT: 315 LBS

## 2020-09-23 DIAGNOSIS — E66.01 MORBID OBESITY (H): ICD-10-CM

## 2020-09-23 DIAGNOSIS — H61.22 IMPACTED CERUMEN OF LEFT EAR: ICD-10-CM

## 2020-09-23 DIAGNOSIS — E11.40 TYPE 2 DIABETES MELLITUS WITH DIABETIC NEUROPATHY, WITHOUT LONG-TERM CURRENT USE OF INSULIN (H): Primary | ICD-10-CM

## 2020-09-23 DIAGNOSIS — Z23 NEED FOR PROPHYLACTIC VACCINATION AND INOCULATION AGAINST INFLUENZA: ICD-10-CM

## 2020-09-23 DIAGNOSIS — Z12.11 COLON CANCER SCREENING: ICD-10-CM

## 2020-09-23 DIAGNOSIS — I10 ESSENTIAL HYPERTENSION WITH GOAL BLOOD PRESSURE LESS THAN 130/80: ICD-10-CM

## 2020-09-23 DIAGNOSIS — Z90.89 HISTORY OF LEFT MASTOIDECTOMY: ICD-10-CM

## 2020-09-23 LAB
ANION GAP SERPL CALCULATED.3IONS-SCNC: 7 MMOL/L (ref 3–14)
BUN SERPL-MCNC: 15 MG/DL (ref 7–30)
CALCIUM SERPL-MCNC: 8.9 MG/DL (ref 8.5–10.1)
CHLORIDE SERPL-SCNC: 102 MMOL/L (ref 94–109)
CO2 SERPL-SCNC: 29 MMOL/L (ref 20–32)
CREAT SERPL-MCNC: 0.96 MG/DL (ref 0.66–1.25)
CREAT UR-MCNC: 221 MG/DL
GFR SERPL CREATININE-BSD FRML MDRD: 86 ML/MIN/{1.73_M2}
GLUCOSE SERPL-MCNC: 222 MG/DL (ref 70–99)
HBA1C MFR BLD: 8.1 % (ref 0–5.6)
MICROALBUMIN UR-MCNC: 13 MG/L
MICROALBUMIN/CREAT UR: 5.7 MG/G CR (ref 0–17)
POTASSIUM SERPL-SCNC: 4.3 MMOL/L (ref 3.4–5.3)
SODIUM SERPL-SCNC: 138 MMOL/L (ref 133–144)

## 2020-09-23 PROCEDURE — 82043 UR ALBUMIN QUANTITATIVE: CPT | Performed by: PHYSICIAN ASSISTANT

## 2020-09-23 PROCEDURE — 36415 COLL VENOUS BLD VENIPUNCTURE: CPT | Performed by: PHYSICIAN ASSISTANT

## 2020-09-23 PROCEDURE — 90682 RIV4 VACC RECOMBINANT DNA IM: CPT | Performed by: PHYSICIAN ASSISTANT

## 2020-09-23 PROCEDURE — 99214 OFFICE O/P EST MOD 30 MIN: CPT | Mod: 25 | Performed by: PHYSICIAN ASSISTANT

## 2020-09-23 PROCEDURE — 83036 HEMOGLOBIN GLYCOSYLATED A1C: CPT | Performed by: PHYSICIAN ASSISTANT

## 2020-09-23 PROCEDURE — 90471 IMMUNIZATION ADMIN: CPT | Performed by: PHYSICIAN ASSISTANT

## 2020-09-23 PROCEDURE — 80048 BASIC METABOLIC PNL TOTAL CA: CPT | Performed by: PHYSICIAN ASSISTANT

## 2020-09-23 RX ORDER — GLIMEPIRIDE 2 MG/1
2 TABLET ORAL
Qty: 90 TABLET | Refills: 1 | Status: SHIPPED | OUTPATIENT
Start: 2020-09-23 | End: 2021-02-15

## 2020-09-23 NOTE — PATIENT INSTRUCTIONS
Will increase Amaryl to 2 mg daily for better glucose control.    Work on decreased carbs.  Try to stay active.    Will refer you back to ENT in [place] for the left ear.    Follow up in 3 months.

## 2020-10-13 ENCOUNTER — ALLIED HEALTH/NURSE VISIT (OUTPATIENT)
Dept: FAMILY MEDICINE | Facility: OTHER | Age: 60
End: 2020-10-13
Payer: COMMERCIAL

## 2020-10-13 VITALS — DIASTOLIC BLOOD PRESSURE: 94 MMHG | SYSTOLIC BLOOD PRESSURE: 163 MMHG | HEART RATE: 78 BPM

## 2020-10-13 DIAGNOSIS — I10 ESSENTIAL HYPERTENSION WITH GOAL BLOOD PRESSURE LESS THAN 130/80: Primary | ICD-10-CM

## 2020-10-13 PROCEDURE — 99207 PR NO CHARGE NURSE ONLY: CPT

## 2020-10-13 NOTE — PROGRESS NOTES
Chung Lincoln is a 60 year old patient who comes in today for a Blood Pressure check.  Initial BP:  BP (!) 163/94   Pulse 78      78  Disposition: follow-up as previously indicated by provider and results routed to provider      Kaylee Charlton MA

## 2020-10-14 ENCOUNTER — TELEPHONE (OUTPATIENT)
Dept: FAMILY MEDICINE | Facility: OTHER | Age: 60
End: 2020-10-14

## 2020-10-14 DIAGNOSIS — I10 ESSENTIAL HYPERTENSION WITH GOAL BLOOD PRESSURE LESS THAN 130/80: Primary | ICD-10-CM

## 2020-10-14 RX ORDER — LISINOPRIL 30 MG/1
30 TABLET ORAL DAILY
Qty: 30 TABLET | Refills: 5 | Status: SHIPPED | OUTPATIENT
Start: 2020-10-14 | End: 2021-05-14

## 2020-10-14 NOTE — TELEPHONE ENCOUNTER
BP remains elevated so recommend increasing lisinopril up to 30 mg and rechecking BP with nurses in 2 weeks. New Rx sent.    Toy Martinez PA-C

## 2020-10-20 ENCOUNTER — TELEPHONE (OUTPATIENT)
Dept: FAMILY MEDICINE | Facility: OTHER | Age: 60
End: 2020-10-20

## 2020-10-20 NOTE — TELEPHONE ENCOUNTER
Reason for Call: Request for an order or referral:    Order or referral being requested: Covid 19 Test    Date needed: as soon as possible    Has the patient been seen by the PCP for this problem? NO    Additional comments: Patient was sent home by employer and is needing a covid test. He has sore muscles, sore throat, cough, light headed, cold feeling. (normal temp)     Phone number Patient can be reached at:  Cell number on file:    Telephone Information:   Mobile [phone]       Best Time:  any    Can we leave a detailed message on this number?  YES    Call taken on 10/20/2020 at 11:26 AM by Caridad Dash

## 2020-10-21 ENCOUNTER — VIRTUAL VISIT (OUTPATIENT)
Dept: FAMILY MEDICINE | Facility: OTHER | Age: 60
End: 2020-10-21
Payer: COMMERCIAL

## 2020-10-21 DIAGNOSIS — Z20.822 PERSON UNDER INVESTIGATION FOR COVID-19: Primary | ICD-10-CM

## 2020-10-21 PROCEDURE — 99213 OFFICE O/P EST LOW 20 MIN: CPT | Mod: 95 | Performed by: PHYSICIAN ASSISTANT

## 2020-10-21 NOTE — PROGRESS NOTES
"Chung Lincoln is a 60 year old male who is being evaluated via a billable telephone visit.      The patient has been notified of following:     \"This telephone visit will be conducted via a call between you and your physician/provider. We have found that certain health care needs can be provided without the need for a physical exam.  This service lets us provide the care you need with a short phone conversation.  If a prescription is necessary we can send it directly to your pharmacy.  If lab work is needed we can place an order for that and you can then stop by our lab to have the test done at a later time.    Telephone visits are billed at different rates depending on your insurance coverage. During this emergency period, for some insurers they may be billed the same as an in-person visit.  Please reach out to your insurance provider with any questions.    If during the course of the call the physician/provider feels a telephone visit is not appropriate, you will not be charged for this service.\"    Patient has given verbal consent for Telephone visit?  Yes    What phone number would you like to be contacted at? [phone]    How would you like to obtain your AVS? Beatrice Adam     Chung Lincoln is a 60 year old male who presents via phone visit today for the following health issues:    HPI     Acute Illness  Acute illness concerns: cold symptoms  Onset/Duration: Monday   Symptoms:  Fever: no  Chills/Sweats: YES  Headache (location?): no  Sinus Pressure: no  Conjunctivitis:  no  Ear Pain: no  Rhinorrhea: no  Congestion: no  Sore Throat: YES but better today   Cough: YES - sometimes productive   Wheeze: YES  Decreased Appetite: no  Nausea: no  Vomiting: no  Diarrhea: YES- yesterday   Dysuria/Freq.: no  Dysuria or Hematuria: no  Fatigue/Achiness: YES  Sick/Strep Exposure: no  Therapies tried and outcome: None       Symptoms began on Monday with fatigue, body aches, sore throat, a mild dry cough with mild " wheezing upon deep breathing, and diarrhea. No shortness of breath. He denies any known exposure to COVID-19. He needs to be tested for COVID-19 before he can return to work.     Review of Systems   Constitutional, HEENT, cardiovascular, pulmonary, gi and gu systems are negative, except as otherwise noted.       Objective        Vitals:  No vitals were obtained today due to virtual visit.  General: alert and no distress over the phone  PSYCH: Alert and oriented times 3; coherent speech, normal   rate and volume, able to articulate logical thoughts, able   to abstract reason, no tangential thoughts, no hallucinations   or delusions. His affect is normal.  RESP: No cough, no audible wheezing, able to talk in full sentences  Remainder of exam unable to be completed due to telephone visits.        Assessment/Plan:    ICD-10-CM    1. Person under investigation for COVID-19  Z20.828 Symptomatic COVID-19 Virus (Coronavirus) by PCR       Patient with symptoms of illness concerning for possible COVID-19 and he cannot return to work until he has a negative test so will order PCR testing.  Instructed to self quarantine for 10 days from onset of symptoms or until symptoms are improving for 24 hours if he tests positive.  I discussed symptomatic treatment with rest, fluids, Tylenol and NSAID avoidance.  If he develops any symptoms, he will contact the clinic.       Toy Martinez PA-C    Phone call duration: 5:45 minutes

## 2020-10-21 NOTE — PATIENT INSTRUCTIONS
"You can call [phone] to schedule a COVID test sooner although the wait time may be substantial.     Discharge Instructions for COVID-19 Patients  You have--or may have--COVID-19. Please follow the instructions listed below.   If you have a weakened immune system, discuss with your doctor any other actions you need to take.  How can I protect others?  If you have symptoms (fever, cough, body aches or trouble breathing):    Stay home and away from others (self-isolate) until:  ? At least 10 days have passed since your symptoms started, And   ? You've had no fever--and no medicine that reduces fever--for 1 full day (24 hours), And    ? Your other symptoms have resolved (gotten better).  If you don't show symptoms, but testing showed that you have COVID-19:    Stay home and away from others (self-isolate). Follow the tips under \"How do I self-isolate?\" below for 10 days (20 days if you have a weak immune system).    You don't need to be retested for COVID-19 before going back to school or work. As long as you're fever-free and feeling better, you can go back to school, work and other activities after waiting the 10 or 20 days.   How do I self-isolate?    Stay in your own room, even for meals. Use your own bathroom if you can.    Stay away from others in your home. No hugging, kissing or shaking hands. No visitors.    Don't go to work, school or anywhere else.    Clean \"high touch\" surfaces often (doorknobs, counters, handles). Use household cleaning spray or wipes. You'll find a full list of  on the EPA website: www.epa.gov/pesticide-registration/list-n-disinfectants-use-against-sars-cov-2.    Cover your mouth and nose with a mask or other face covering to avoid spreading germs.    Wash your hands and face often. Use soap and water.    Caregivers in these groups are at risk for severe illness due to COVID-19:  ? People 65 years and older  ? People who live in a nursing home or long-term care " facility  ? People with chronic disease (lung, heart, cancer, diabetes, kidney, liver, immunologic)  ? People who have a weakened immune system, including those who:    Are in cancer treatment    Take medicine that weakens the immune system, such as corticosteroids    Had a bone marrow or organ transplant    Have an immune deficiency    Have poorly controlled HIV or AIDS    Are obese (body mass index of 40 or higher)    Smoke regularly    Caregivers should wear gloves while washing dishes, handling laundry and cleaning bedrooms and bathrooms.    Use caution when washing and drying laundry: Don't shake dirty laundry and use the warmest water setting that you can.    For more tips on managing your health at home, go to www.cdc.gov/coronavirus/2019-ncov/downloads/10Things.pdf.  How can I take care of myself at home?  1. Get lots of rest. Drink extra fluids (unless a doctor has told you not to).    2. Take Tylenol (acetaminophen) for fever or pain. If you have liver or kidney problems, ask your family doctor if it's okay to take Tylenol.     Adults can take either:  ? 650 mg (two 325 mg pills) every 4 to 6 hours, or   ? 1,000 mg (two 500 mg pills) every 8 hours as needed.  ? Note: Don't take more than 3,000 mg in one day. Acetaminophen is found in many medicines (both prescribed and over-the-counter medicines). Read all labels to be sure you don't take too much.   For children, check the Tylenol bottle for the right dose. The dose is based on the child's age or weight.  3. If you have other health problems (like cancer, heart failure, an organ transplant or severe kidney disease): Call your specialty clinic if you don't feel better in the next 2 days.    4. Know when to call 911. Emergency warning signs include:  ? Trouble breathing or shortness of breath  ? Pain or pressure in the chest that doesn't go away  ? Feeling confused like you haven't felt before, or not being able to wake up  ? Bluish-colored lips or  face    5. Your doctor may have prescribed a blood thinner medicine. Follow their instructions.  Where can I get more information?    [facility] - About COVID-19: [email or web address]    CDC - What to Do If You're Sick: www.cdc.gov/coronavirus/2019-ncov/about/steps-when-sick.html    CDC - Ending Home Isolation: www.cdc.gov/coronavirus/2019-ncov/hcp/disposition-in-home-patients.html    [facility] - Caring for Someone: www.cdc.gov/coronavirus/2019-ncov/if-you-are-sick/care-for-someone.html    [facility] - Interim Guidance for Hospital Discharge to Home: www.health.[facility].[place]/diseases/coronavirus/hcp/hospdischarge.pdf    [facility] clinical trials (COVID-19 research studies): [email or web address]-clinical-trials    Below are the COVID-19 hotlines at the [place] Department of Health ([facility]). Interpreters are available.  ? For health questions: Call 650-225-3925 or 1-278.329.6509 (7 a.m. to 7 p.m.)  ? For questions about schools and childcare: Call 292-274-6520 or 1-591.829.7096 (7 a.m. to 7 p.m.)    For informational purposes only. Not to replace the advice of your health care provider. Clinically reviewed by the Infection Prevention Team. Copyright   2020 [facility]. All rights reserved. [employer] 789844 - REV 08/04/20.

## 2021-01-09 ENCOUNTER — HEALTH MAINTENANCE LETTER (OUTPATIENT)
Age: 61
End: 2021-01-09

## 2021-02-11 NOTE — PROGRESS NOTES
Chung is a 60 year old who is being evaluated via a billable video visit.      How would you like to obtain your AVS? [email or web address]  If the video visit is dropped, the invitation should be resent by: Text to cell phone: [phone]  Will anyone else be joining your video visit? No    Video Start Time: 3:39 pm    Subjective   Chung is a 60 year old who presents for the following health issues:    HPI     Diabetes Follow-up  How often are you checking your blood sugar? A few times a month  What time of day are you checking your blood sugars (select all that apply)?  Before and after meals  Have you had any blood sugars above 200?  Yes   Have you had any blood sugars below 70?  No    What symptoms do you notice when your blood sugar is low?  None    What concerns do you have today about your diabetes? Blood sugar is often over 200     Do you have any of these symptoms? (Select all that apply)  Numbness in feet    Have you had a diabetic eye exam in the last 12 months? No    He has been eating poorly over the past few months so his glucose readings have been higher, often above 200. He has been trying to eat better over the past week with less sugary, carb loaded foods and more vegetables. He usually walks a lot in his shop but has not been doing this as much lately either.       Hyperlipidemia Follow-up    Are you regularly taking any medication or supplement to lower your cholesterol?   Yes- simvastatin    Are you having muscle aches or other side effects that you think could be caused by your cholesterol lowering medication?  No    Hypertension Follow-up    Do you check your blood pressure regularly outside of the clinic? No     Are you following a low salt diet? No no added salt     Are your blood pressures ever more than 140 on the top number (systolic) OR more   than 90 on the bottom number (diastolic), for example 140/90? unsure    BP Readings from Last 2 Encounters:   10/13/20 (!) 163/94   09/23/20 (!) 162/74      Hemoglobin A1C (%)   Date Value   09/23/2020 8.1 (H)   03/04/2020 7.4 (H)     LDL Cholesterol Calculated (mg/dL)   Date Value   03/04/2020 55   03/04/2019 69       Review of Systems   Constitutional, HEENT, cardiovascular, pulmonary, gi and gu systems are negative, except as otherwise noted.      Objective       Vitals:  No vitals were obtained today due to virtual visit.    Physical Exam   GENERAL: Healthy, alert and no distress  EYES: Eyes grossly normal to inspection.  No discharge or erythema, or obvious scleral/conjunctival abnormalities.  RESP: No audible wheeze, cough, or visible cyanosis.  No visible retractions or increased work of breathing.    SKIN: Visible skin clear. No significant rash, abnormal pigmentation or lesions.  NEURO: Cranial nerves grossly intact.  Mentation and speech appropriate for age.  PSYCH: Mentation appears normal, affect normal/bright, judgement and insight intact, normal speech and appearance well-groomed.    Lab Results   Component Value Date    A1C 8.1 09/23/2020    A1C 7.4 03/04/2020    A1C 9.2 11/22/2019    A1C 6.9 03/04/2019    A1C 7.3 11/29/2018       Assessment & Plan       ICD-10-CM    1. Type 2 diabetes mellitus with diabetic neuropathy, without long-term current use of insulin (H)  E11.40 glimepiride (AMARYL) 2 MG tablet     metFORMIN (GLUCOPHAGE) 1000 MG tablet     Hemoglobin A1c   2. Hyperlipidemia LDL goal <100  E78.5 Lipid panel reflex to direct LDL Fasting     simvastatin (ZOCOR) 10 MG tablet   3. Essential hypertension with goal blood pressure less than 130/80  I10    4. Morbid obesity (H)  E66.01        1, 4. His hemoglobin A1c in September was up to 8.1 so Amaryl was increased to 2 mg. His glucose remains above 200 persistently but he is working on a healthier diet will further increase Amaryl to 4 mg and continue current dose of metformin. Will plan on updated A1c in 2 weeks. I encouraged more physical activity as well. Follow up in clinic in 3 months.    2.  Updated fasting lipid panel to be completed in 2 weeks. Continue simvastatin.    3. BP was elevated at his last visit. Will recheck in 2 weeks.    Toy Martinez PA-C  [facility]          Video-Visit Details    Type of service:  Video Visit    Video End Time: 3:48 pm    Originating Location (pt. Location): Home    Distant Location (provider location):  [facility]     Platform used for Video Visit: Christi

## 2021-02-15 ENCOUNTER — TELEPHONE (OUTPATIENT)
Dept: FAMILY MEDICINE | Facility: OTHER | Age: 61
End: 2021-02-15

## 2021-02-15 ENCOUNTER — VIRTUAL VISIT (OUTPATIENT)
Dept: FAMILY MEDICINE | Facility: OTHER | Age: 61
End: 2021-02-15
Payer: COMMERCIAL

## 2021-02-15 DIAGNOSIS — E66.01 MORBID OBESITY (H): ICD-10-CM

## 2021-02-15 DIAGNOSIS — E78.5 HYPERLIPIDEMIA LDL GOAL <100: ICD-10-CM

## 2021-02-15 DIAGNOSIS — I10 ESSENTIAL HYPERTENSION WITH GOAL BLOOD PRESSURE LESS THAN 130/80: ICD-10-CM

## 2021-02-15 DIAGNOSIS — E11.40 TYPE 2 DIABETES MELLITUS WITH DIABETIC NEUROPATHY, WITHOUT LONG-TERM CURRENT USE OF INSULIN (H): Primary | ICD-10-CM

## 2021-02-15 PROCEDURE — 99214 OFFICE O/P EST MOD 30 MIN: CPT | Mod: GT | Performed by: PHYSICIAN ASSISTANT

## 2021-02-15 RX ORDER — SIMVASTATIN 10 MG
10 TABLET ORAL AT BEDTIME
Qty: 90 TABLET | Refills: 3 | Status: SHIPPED | OUTPATIENT
Start: 2021-02-15 | End: 2022-02-15

## 2021-02-15 RX ORDER — GLIMEPIRIDE 2 MG/1
4 TABLET ORAL
Qty: 180 TABLET | Refills: 1 | Status: SHIPPED | OUTPATIENT
Start: 2021-02-15 | End: 2021-03-11

## 2021-02-15 NOTE — TELEPHONE ENCOUNTER
Spoke to patient and he is scheduled for lab and bp ck-- wanted to schedule himself through my chart with SCOTT in 3 months.

## 2021-02-15 NOTE — Clinical Note
Please schedule updated labs and nursing BP check in 2 weeks  Follow-up with me in clinic in 3 months.

## 2021-02-15 NOTE — TELEPHONE ENCOUNTER
Toy Martinez PA-C [facility] Float Pool             Please schedule updated labs and nursing BP check in 2 weeks   Follow-up with me in clinic in 3 months.

## 2021-02-15 NOTE — PATIENT INSTRUCTIONS
Will increase Amaryl further to 4 mg instead of 2 mg with breakfast.  Continue to work on less sweets/carbs and more vegetables and high protein foods.  Work on more routine physical activity.    Will check labs and blood pressure in 2 weeks.    Follow up in 3 months with me in clinic.

## 2021-03-11 DIAGNOSIS — E11.9 TYPE 2 DIABETES MELLITUS WITHOUT COMPLICATION, WITHOUT LONG-TERM CURRENT USE OF INSULIN (H): ICD-10-CM

## 2021-03-11 DIAGNOSIS — Z12.11 COLON CANCER SCREENING: ICD-10-CM

## 2021-03-11 DIAGNOSIS — E78.5 HYPERLIPIDEMIA LDL GOAL <100: ICD-10-CM

## 2021-03-11 DIAGNOSIS — E11.40 TYPE 2 DIABETES MELLITUS WITH DIABETIC NEUROPATHY, WITHOUT LONG-TERM CURRENT USE OF INSULIN (H): ICD-10-CM

## 2021-03-11 LAB
CHOLEST SERPL-MCNC: 136 MG/DL
HBA1C MFR BLD: 8.3 % (ref 0–5.6)
HDLC SERPL-MCNC: 35 MG/DL
HEMOCCULT STL QL IA: NEGATIVE
LDLC SERPL CALC-MCNC: 48 MG/DL
NONHDLC SERPL-MCNC: 101 MG/DL
TRIGL SERPL-MCNC: 264 MG/DL

## 2021-03-11 PROCEDURE — 36415 COLL VENOUS BLD VENIPUNCTURE: CPT | Performed by: PHYSICIAN ASSISTANT

## 2021-03-11 PROCEDURE — 82274 ASSAY TEST FOR BLOOD FECAL: CPT | Performed by: PHYSICIAN ASSISTANT

## 2021-03-11 PROCEDURE — 80061 LIPID PANEL: CPT | Performed by: PHYSICIAN ASSISTANT

## 2021-03-11 PROCEDURE — 83036 HEMOGLOBIN GLYCOSYLATED A1C: CPT | Performed by: PHYSICIAN ASSISTANT

## 2021-03-11 RX ORDER — GLIMEPIRIDE 4 MG/1
8 TABLET ORAL
Qty: 180 TABLET | Refills: 1 | Status: SHIPPED | OUTPATIENT
Start: 2021-03-11 | End: 2021-08-18

## 2021-03-11 NOTE — TELEPHONE ENCOUNTER
Pending Prescriptions:                       Disp   Refills    blood glucose (NO BRAND SPECIFIED) lancets*                Sig: Use to test blood sugar 1 times daily or as directed.    Routing refill request to provider for review/approval because:  Script was from 2016

## 2021-03-19 ENCOUNTER — ALLIED HEALTH/NURSE VISIT (OUTPATIENT)
Dept: FAMILY MEDICINE | Facility: OTHER | Age: 61
End: 2021-03-19
Payer: COMMERCIAL

## 2021-03-19 VITALS — HEART RATE: 68 BPM | SYSTOLIC BLOOD PRESSURE: 130 MMHG | DIASTOLIC BLOOD PRESSURE: 88 MMHG

## 2021-03-19 DIAGNOSIS — Z01.30 BP CHECK: Primary | ICD-10-CM

## 2021-03-19 PROCEDURE — 99207 PR NO CHARGE NURSE ONLY: CPT

## 2021-03-19 ASSESSMENT — PAIN SCALES - GENERAL: PAINLEVEL: NO PAIN (1)

## 2021-03-19 NOTE — PROGRESS NOTES
Chung Lincoln is a 60 year old patient who comes in today for a Blood Pressure check.  Initial BP:  /88   Pulse 68      68  Disposition: follow-up as previously indicated by provider

## 2021-05-13 DIAGNOSIS — I10 ESSENTIAL HYPERTENSION WITH GOAL BLOOD PRESSURE LESS THAN 130/80: ICD-10-CM

## 2021-05-14 RX ORDER — LISINOPRIL 30 MG/1
TABLET ORAL
Qty: 90 TABLET | Refills: 0 | Status: SHIPPED | OUTPATIENT
Start: 2021-05-14 | End: 2021-06-02

## 2021-05-14 NOTE — TELEPHONE ENCOUNTER
Pending Prescriptions:                       Disp   Refills    lisinopril (ZESTRIL) 30 MG tablet [Pharma*90 tab*0            Sig: TAKE ONE TABLET BY MOUTH ONCE DAILY    Medication is being filled for 1 time melodie refill only due to:  Patient is due for medication follow up    Please call and help schedule.  Thank you!    Verenice Mckeon RN on 5/14/2021 at 7:24 AM

## 2021-05-18 NOTE — PROGRESS NOTES
Assessment & Plan       ICD-10-CM    1. Type 2 diabetes mellitus with diabetic neuropathy, without long-term current use of insulin (H)  E11.40 Hemoglobin A1c     empagliflozin (JARDIANCE) 10 MG TABS tablet     blood glucose (ACCU-CHEK SAMANTHA PLUS) test strip   2. Strain of left shoulder, initial encounter  S46.912A    3. Essential hypertension with goal blood pressure less than 130/80  I10 lisinopril (ZESTRIL) 40 MG tablet       1. Will recheck hemoglobin A1c today but I anticipate a similar A1c to his last visit given his home readings. I recommend adding 10 mg of Jardiance daily. Common side effects discussed. If this gets approved, I recommend he only take 4 mg of Amaryl to avoid hypoglycemia. He will also work on a lower carb diet and more routine exercise as he feels he has the discipline to make these changes. Will plan on recheck in 3 months.    2. Left shoulder strain with normal exam today. I recommend home stretching, ice, heat and ibuprofen/Tylenol as needed.    3. Blood pressure elevated during both readings. Will increase lisinopril to 40 mg and will recheck nursing BP in 2 weeks. Encouraged to cut back on the processed foods and sodium.      LIBERTY Luna [facility]    Kia Wilkes is a 61 year old who presents for the following health issues     History of Present Illness       Diabetes:   He presents for follow up of diabetes.  He is checking home blood glucose a few times a month. He checks blood glucose before meals.  Blood glucose is never over 200 and never under 70. He is aware of hypoglycemia symptoms including weakness. He has no concerns regarding his diabetes at this time.  He is having numbness in feet and burning in feet. The patient has not had a diabetic eye exam in the last 12 months.         He eats 2-3 servings of fruits and vegetables daily.He exercises with enough effort to increase his heart rate 9 or less minutes per day.    He is taking  medications regularly.     He was checking his glucose more frequently a few months ago and it was below 150 consistently but now it has been more elevated recently in the 180s-190s as he has been eating more carbs. He does not get any routine exercise. He knows he needs to work on better discipline.    He has had a sore left shoulder for the past few weeks. He denies any obvious injury but may have overdid it at work. He has had some chiropractic adjustments with some relief.     Review of Systems   Constitutional, cardiovascular, pulmonary, musculoskeletal, neuro, gi and gu systems are negative, except as otherwise noted.      Objective    BP (!) 150/94   Pulse 71   Temp 97.8  F (36.6  C) (Temporal)   Resp 14   Wt 144.2 kg (318 lb)   SpO2 97%   BMI 48.35 kg/m    Body mass index is 48.35 kg/m .  Physical Exam   GENERAL: healthy, alert and no distress  RESP: lungs clear to auscultation - no rales, rhonchi or wheezes  CV: regular rate and rhythm, normal S1 S2, no S3 or S4, no murmur, click or rub, trace peripheral edema  MS: no gross musculoskeletal defects noted, no edema. No left shoulder tenderness to palpation. Full range of motion. Normal empty can testing.   NEURO: Normal strength and tone, mentation intact and speech normal. Gait is stable.   PSYCH: mentation appears normal, affect normal/bright        Lab Results   Component Value Date    A1C 8.3 03/11/2021    A1C 8.1 09/23/2020    A1C 7.4 03/04/2020    A1C 9.2 11/22/2019    A1C 6.9 03/04/2019

## 2021-05-19 ENCOUNTER — OFFICE VISIT (OUTPATIENT)
Dept: FAMILY MEDICINE | Facility: OTHER | Age: 61
End: 2021-05-19
Payer: COMMERCIAL

## 2021-05-19 VITALS
WEIGHT: 315 LBS | RESPIRATION RATE: 14 BRPM | TEMPERATURE: 97.8 F | SYSTOLIC BLOOD PRESSURE: 150 MMHG | HEART RATE: 71 BPM | DIASTOLIC BLOOD PRESSURE: 94 MMHG | OXYGEN SATURATION: 97 % | BODY MASS INDEX: 48.35 KG/M2

## 2021-05-19 DIAGNOSIS — E11.40 TYPE 2 DIABETES MELLITUS WITH DIABETIC NEUROPATHY, WITHOUT LONG-TERM CURRENT USE OF INSULIN (H): Primary | ICD-10-CM

## 2021-05-19 DIAGNOSIS — I10 ESSENTIAL HYPERTENSION WITH GOAL BLOOD PRESSURE LESS THAN 130/80: ICD-10-CM

## 2021-05-19 DIAGNOSIS — S46.912A STRAIN OF LEFT SHOULDER, INITIAL ENCOUNTER: ICD-10-CM

## 2021-05-19 LAB — HBA1C MFR BLD: 8.5 % (ref 0–5.6)

## 2021-05-19 PROCEDURE — 36415 COLL VENOUS BLD VENIPUNCTURE: CPT | Performed by: PHYSICIAN ASSISTANT

## 2021-05-19 PROCEDURE — 83036 HEMOGLOBIN GLYCOSYLATED A1C: CPT | Performed by: PHYSICIAN ASSISTANT

## 2021-05-19 PROCEDURE — 99214 OFFICE O/P EST MOD 30 MIN: CPT | Performed by: PHYSICIAN ASSISTANT

## 2021-05-19 RX ORDER — LISINOPRIL 40 MG/1
40 TABLET ORAL DAILY
Qty: 90 TABLET | Refills: 3 | Status: SHIPPED | OUTPATIENT
Start: 2021-05-19 | End: 2022-05-17

## 2021-05-19 RX ORDER — BLOOD SUGAR DIAGNOSTIC
STRIP MISCELLANEOUS
Qty: 100 STRIP | Refills: 5 | Status: SHIPPED | OUTPATIENT
Start: 2021-05-19 | End: 2023-12-28

## 2021-05-19 NOTE — PATIENT INSTRUCTIONS
Will add Jardiance to your diabetic regimen to take in the mornings with your other medications.  If you start this, take only 1 tablet of the glimepiride (Amaryl).  Work on a lower carb diet with more routine exercise.    Will increase lisinopril to 40 mg for better blood pressure control.  Cut back on the processed foods/sodium.  Will plan on rechecking your blood pressure in 2 weeks with nurses.     Follow up in 3 months.

## 2021-06-02 ENCOUNTER — TELEPHONE (OUTPATIENT)
Dept: FAMILY MEDICINE | Facility: OTHER | Age: 61
End: 2021-06-02

## 2021-06-02 ENCOUNTER — ALLIED HEALTH/NURSE VISIT (OUTPATIENT)
Dept: FAMILY MEDICINE | Facility: OTHER | Age: 61
End: 2021-06-02
Payer: COMMERCIAL

## 2021-06-02 VITALS — DIASTOLIC BLOOD PRESSURE: 76 MMHG | SYSTOLIC BLOOD PRESSURE: 142 MMHG

## 2021-06-02 DIAGNOSIS — I10 ESSENTIAL HYPERTENSION WITH GOAL BLOOD PRESSURE LESS THAN 130/80: Primary | ICD-10-CM

## 2021-06-02 PROCEDURE — 99207 PR NO CHARGE NURSE ONLY: CPT

## 2021-06-02 NOTE — TELEPHONE ENCOUNTER
GLENISFP to return call to clinic. Please inform pt SCOTT would like him to come back in two weeks on the float schedule for BP check.      Shana Solares CMA

## 2021-06-02 NOTE — TELEPHONE ENCOUNTER
Let's have him recheck it again in another 2 weeks on the float schedule. Thanks.    Toy Martinez PA-C

## 2021-06-03 NOTE — TELEPHONE ENCOUNTER
Left message for patient for patient to return call. When call is returned, please assist with scheduling. MyChart message sent.   Kaylee Charlton MA

## 2021-06-17 ENCOUNTER — ALLIED HEALTH/NURSE VISIT (OUTPATIENT)
Dept: FAMILY MEDICINE | Facility: OTHER | Age: 61
End: 2021-06-17
Payer: COMMERCIAL

## 2021-06-17 VITALS — HEART RATE: 80 BPM | DIASTOLIC BLOOD PRESSURE: 82 MMHG | SYSTOLIC BLOOD PRESSURE: 134 MMHG

## 2021-06-17 DIAGNOSIS — Z01.30 BP CHECK: Primary | ICD-10-CM

## 2021-06-17 PROCEDURE — 99207 PR NO CHARGE NURSE ONLY: CPT

## 2021-06-17 NOTE — PROGRESS NOTES
Chung Lincoln is a 61 year old patient who comes in today for a Blood Pressure check.  Initial BP:  /82   Pulse 80      Data Unavailable  Disposition: follow-up as previously indicated by provider

## 2021-06-21 ENCOUNTER — TELEPHONE (OUTPATIENT)
Dept: FAMILY MEDICINE | Facility: OTHER | Age: 61
End: 2021-06-21

## 2021-06-21 NOTE — TELEPHONE ENCOUNTER
Please call and see what patient's glucose has been running and if Jardiance was covered by his insurance. Thanks.    Toy Martinez PA-C

## 2021-06-21 NOTE — TELEPHONE ENCOUNTER
Patient states that the blood sugars have been running an average of 160. Patient was able to get the Jardiance.     On Staurday the patient stated his BS got down to 95. The patient started having symptoms of hypoglycemia. The patient ate and drank some OJ and that helped his symptoms.     Please advise.       Severiano Calderon RN, BSN  [facility] [facility]  June 21, 2021

## 2021-06-22 NOTE — TELEPHONE ENCOUNTER
Thanks for the update. Continue to monitor glucose closely and will not make any medication changes at this time.    Toy Martinez PA-C

## 2021-08-17 NOTE — PROGRESS NOTES
Assessment & Plan       ICD-10-CM    1. Type 2 diabetes mellitus with diabetic neuropathy, without long-term current use of insulin (H)  E11.40 Hemoglobin A1c     Hemoglobin A1c     metFORMIN (GLUCOPHAGE) 1000 MG tablet     glimepiride (AMARYL) 4 MG tablet     empagliflozin (JARDIANCE) 10 MG TABS tablet   2. Essential hypertension with goal blood pressure less than 130/80  I10    3. Tingling of both feet  R20.2    4. Screening for prostate cancer  Z12.5 PSA, screen     PSA, screen       1. His A1c has improved from 8.5 to 7.2 which is great! He has also lost 10 pounds. I encouraged him to keep up the healthy diet and weight loss and his numbers will continue to improve. Will not make any medication changes at this time and will plan on follow up in 6 months for a recheck.    2. Blood pressure is stable so will continue current dose of lisinopril.    3. Chronic tingling in both feet, likely due to his diabetes along with possible lumbar radiculopathy. He denies significant pain so I recommend continued home stretching and chiropractic. If symptoms worsen, can consider lumbar imaging.    4. PSA screening ordered.    Follow up in 6 months.       LIBERTY Luna [facility]    Kia Wilkes is a 61 year old who presents for the following health issues:    History of Present Illness       Diabetes:   He presents for follow up of diabetes.  He is checking home blood glucose a few times a month. He checks blood glucose before meals.  Blood glucose is never over 200 and never under 70. He is aware of hypoglycemia symptoms including weakness and lethargy. He has no concerns regarding his diabetes at this time.  He is having numbness in feet. The patient has not had a diabetic eye exam in the last 12 months.         Hypertension: He presents for follow up of hypertension.  He does not check blood pressure  regularly outside of the clinic.  He follows a low salt diet.     He eats 2-3  servings of fruits and vegetables daily.He consumes 0 sweetened beverage(s) daily.He exercises with enough effort to increase his heart rate 20 to 29 minutes per day.  He exercises with enough effort to increase his heart rate 4 days per week.   He is taking medications regularly.     He has been checking his glucose a little bit more frequently before breakfast and it has been between 150-180. He has been trying to eat better the past few months but it is difficult to maintain a healthy diet. He does not get much routine, scheduled exercise outside of work but has been building a shed this summer so this takes up a lot of time and energy on the weekends. He is tolerating the Jardiance without any issues.     He has noticed chronic numbness in his toes for 20 years, especially in the middle of the night he will wake up and his toes will be numb but will improve if he arches his back. He denies low back pain or shooting pain into his legs. He sees a chiropractor every 2 weeks.    Review of Systems   Constitutional, cardiovascular, pulmonary, gi and gu systems are negative, except as otherwise noted.      Objective    /68   Pulse 78   Temp 96.8  F (36  C) (Temporal)   Resp 16   Wt 139.7 kg (308 lb)   SpO2 99%   BMI 46.83 kg/m    Body mass index is 46.83 kg/m .  Physical Exam   GENERAL: healthy, alert and no distress  RESP: lungs clear to auscultation - no rales, rhonchi or wheezes  CV: regular rate and rhythm, normal S1 S2, no S3 or S4, no murmur, click or rub, no peripheral edema and peripheral pulses strong  NEURO: Normal strength and tone, mentation intact and speech normal. Gait is stable.  PSYCH: mentation appears normal, affect normal/bright  Diabetic foot exam: normal DP and PT pulses, no trophic changes or ulcerative lesions, normal sensory exam and normal monofilament exam, except for absent monofilament sensation over area 1 bilaterally.                    Lab Results   Component Value Date     A1C 7.2 08/18/2021    A1C 8.5 05/19/2021    A1C 8.3 03/11/2021    A1C 8.1 09/23/2020    A1C 7.4 03/04/2020    A1C 9.2 11/22/2019

## 2021-08-18 ENCOUNTER — OFFICE VISIT (OUTPATIENT)
Dept: FAMILY MEDICINE | Facility: OTHER | Age: 61
End: 2021-08-18
Payer: COMMERCIAL

## 2021-08-18 VITALS
BODY MASS INDEX: 46.83 KG/M2 | SYSTOLIC BLOOD PRESSURE: 138 MMHG | OXYGEN SATURATION: 99 % | DIASTOLIC BLOOD PRESSURE: 68 MMHG | WEIGHT: 308 LBS | RESPIRATION RATE: 16 BRPM | HEART RATE: 78 BPM | TEMPERATURE: 96.8 F

## 2021-08-18 DIAGNOSIS — E11.40 TYPE 2 DIABETES MELLITUS WITH DIABETIC NEUROPATHY, WITHOUT LONG-TERM CURRENT USE OF INSULIN (H): Primary | ICD-10-CM

## 2021-08-18 DIAGNOSIS — I10 ESSENTIAL HYPERTENSION WITH GOAL BLOOD PRESSURE LESS THAN 130/80: ICD-10-CM

## 2021-08-18 DIAGNOSIS — Z12.5 SCREENING FOR PROSTATE CANCER: ICD-10-CM

## 2021-08-18 DIAGNOSIS — R20.2 TINGLING OF BOTH FEET: ICD-10-CM

## 2021-08-18 LAB
HBA1C MFR BLD: 7.2 % (ref 0–5.6)
PSA SERPL-MCNC: 0.52 UG/L (ref 0–4)

## 2021-08-18 PROCEDURE — 99207 PR FOOT EXAM NO CHARGE: CPT | Performed by: PHYSICIAN ASSISTANT

## 2021-08-18 PROCEDURE — 99214 OFFICE O/P EST MOD 30 MIN: CPT | Performed by: PHYSICIAN ASSISTANT

## 2021-08-18 PROCEDURE — 83036 HEMOGLOBIN GLYCOSYLATED A1C: CPT | Performed by: PHYSICIAN ASSISTANT

## 2021-08-18 PROCEDURE — G0103 PSA SCREENING: HCPCS | Performed by: PHYSICIAN ASSISTANT

## 2021-08-18 PROCEDURE — 36415 COLL VENOUS BLD VENIPUNCTURE: CPT | Performed by: PHYSICIAN ASSISTANT

## 2021-08-18 RX ORDER — GLIMEPIRIDE 4 MG/1
8 TABLET ORAL
Qty: 180 TABLET | Refills: 1 | Status: SHIPPED | OUTPATIENT
Start: 2021-08-18 | End: 2022-02-15

## 2021-08-18 ASSESSMENT — PAIN SCALES - GENERAL: PAINLEVEL: NO PAIN (1)

## 2021-08-18 NOTE — PATIENT INSTRUCTIONS
Your hemoglobin A1c has improved to 7.2 which is great!  Continue to work on a healthier diet and more active lifestyle.  Continue the same in medications.    Continue with chiropractic treatment.    Follow up in 6 months for a recheck.

## 2021-10-23 ENCOUNTER — HEALTH MAINTENANCE LETTER (OUTPATIENT)
Age: 61
End: 2021-10-23

## 2021-11-14 DIAGNOSIS — E11.40 TYPE 2 DIABETES MELLITUS WITH DIABETIC NEUROPATHY, WITHOUT LONG-TERM CURRENT USE OF INSULIN (H): ICD-10-CM

## 2021-11-17 RX ORDER — EMPAGLIFLOZIN 10 MG/1
TABLET, FILM COATED ORAL
Qty: 30 TABLET | Refills: 5 | OUTPATIENT
Start: 2021-11-17

## 2021-11-17 NOTE — TELEPHONE ENCOUNTER
Refilled 8/18/21 for #90 with 1 refill. Should not be needed.  Kristen Ramachandran RN on 11/17/2021 at 10:39 AM

## 2022-02-12 ENCOUNTER — HEALTH MAINTENANCE LETTER (OUTPATIENT)
Age: 62
End: 2022-02-12

## 2022-02-15 DIAGNOSIS — E78.5 HYPERLIPIDEMIA LDL GOAL <100: ICD-10-CM

## 2022-02-15 DIAGNOSIS — E11.40 TYPE 2 DIABETES MELLITUS WITH DIABETIC NEUROPATHY, WITHOUT LONG-TERM CURRENT USE OF INSULIN (H): ICD-10-CM

## 2022-02-15 RX ORDER — SIMVASTATIN 10 MG
TABLET ORAL
Qty: 90 TABLET | Refills: 0 | Status: SHIPPED | OUTPATIENT
Start: 2022-02-15 | End: 2022-03-30

## 2022-02-15 RX ORDER — GLIMEPIRIDE 4 MG/1
TABLET ORAL
Qty: 180 TABLET | Refills: 0 | Status: SHIPPED | OUTPATIENT
Start: 2022-02-15 | End: 2022-03-30

## 2022-02-15 NOTE — TELEPHONE ENCOUNTER
"Pending Prescriptions:                       Disp   Refills    simvastatin (ZOCOR) 10 MG tablet [Pharmacy*90 tab*3        Sig: TAKE ONE TABLET BY MOUTH AT BEDTIME    glimepiride (AMARYL) 4 MG tablet [Pharmacy*180 ta*1        Sig: TAKE TWO TABLETS BY MOUTH EVERY MORNING BEFORE           BREAKFAST        Routing refill request to provider for review/approval because:  Sulfonylurea Agents Failed    Rerun Protocol (2/15/2022 7:45 AM)      Patient has documented A1c within the specified period of time.    If HgbA1C is 8 or greater, it needs to be on file within the past 3 months.  If less than 8, must be on file within the past 6 months.          Recent Labs   Lab Test 08/18/21  1651   A1C 7.2*         Patient has a recent creatinine (normal) within the past 12 mos.        Recent Labs   Lab Test 09/23/20  1630   CR 0.96      Ok to refill medication if creatinine is low      Recent (6 mo) or future (30 days) visit within the authorizing provider's specialty    Patient had office visit in the last 6 months or has a visit in the next 30 days with authorizing provider or within the authorizing provider's specialty.  See \"Patient Info\" tab in inbasket, or \"Choose Columns\" in Meds & Orders section of the refill encounter.        Medication is active on med list          Patient is age 18 or older      NAMITA Meyers, RN, CHETN  [place] [facility]  February 15, 2022          "

## 2022-02-25 ENCOUNTER — TRANSFERRED RECORDS (OUTPATIENT)
Dept: HEALTH INFORMATION MANAGEMENT | Facility: CLINIC | Age: 62
End: 2022-02-25
Payer: COMMERCIAL

## 2022-02-25 LAB — RETINOPATHY: NEGATIVE

## 2022-03-30 ENCOUNTER — OFFICE VISIT (OUTPATIENT)
Dept: FAMILY MEDICINE | Facility: OTHER | Age: 62
End: 2022-03-30
Payer: COMMERCIAL

## 2022-03-30 VITALS
BODY MASS INDEX: 47.44 KG/M2 | WEIGHT: 313 LBS | SYSTOLIC BLOOD PRESSURE: 142 MMHG | DIASTOLIC BLOOD PRESSURE: 88 MMHG | RESPIRATION RATE: 20 BRPM | HEIGHT: 68 IN | TEMPERATURE: 97.2 F | HEART RATE: 88 BPM | OXYGEN SATURATION: 96 %

## 2022-03-30 DIAGNOSIS — E78.5 HYPERLIPIDEMIA LDL GOAL <100: ICD-10-CM

## 2022-03-30 DIAGNOSIS — L84 CALLUS OF FOOT: ICD-10-CM

## 2022-03-30 DIAGNOSIS — Z12.11 COLON CANCER SCREENING: ICD-10-CM

## 2022-03-30 DIAGNOSIS — E66.01 MORBID OBESITY (H): ICD-10-CM

## 2022-03-30 DIAGNOSIS — I10 ESSENTIAL HYPERTENSION WITH GOAL BLOOD PRESSURE LESS THAN 130/80: ICD-10-CM

## 2022-03-30 DIAGNOSIS — E11.40 TYPE 2 DIABETES MELLITUS WITH DIABETIC NEUROPATHY, WITHOUT LONG-TERM CURRENT USE OF INSULIN (H): Primary | ICD-10-CM

## 2022-03-30 LAB
ANION GAP SERPL CALCULATED.3IONS-SCNC: 3 MMOL/L (ref 3–14)
BUN SERPL-MCNC: 18 MG/DL (ref 7–30)
CALCIUM SERPL-MCNC: 9.2 MG/DL (ref 8.5–10.1)
CHLORIDE BLD-SCNC: 108 MMOL/L (ref 94–109)
CHOLEST SERPL-MCNC: 171 MG/DL
CO2 SERPL-SCNC: 29 MMOL/L (ref 20–32)
CREAT SERPL-MCNC: 1.05 MG/DL (ref 0.66–1.25)
CREAT UR-MCNC: 74 MG/DL
FASTING STATUS PATIENT QL REPORTED: NO
GFR SERPL CREATININE-BSD FRML MDRD: 81 ML/MIN/1.73M2
GLUCOSE BLD-MCNC: 168 MG/DL (ref 70–99)
HBA1C MFR BLD: 8.3 % (ref 0–5.6)
HDLC SERPL-MCNC: 33 MG/DL
LDLC SERPL CALC-MCNC: 97 MG/DL
LDLC SERPL CALC-MCNC: ABNORMAL MG/DL
MICROALBUMIN UR-MCNC: 6 MG/L
MICROALBUMIN/CREAT UR: 8.11 MG/G CR (ref 0–17)
NONHDLC SERPL-MCNC: 138 MG/DL
POTASSIUM BLD-SCNC: 4.5 MMOL/L (ref 3.4–5.3)
SODIUM SERPL-SCNC: 140 MMOL/L (ref 133–144)
TRIGL SERPL-MCNC: 451 MG/DL

## 2022-03-30 PROCEDURE — 83036 HEMOGLOBIN GLYCOSYLATED A1C: CPT | Performed by: PHYSICIAN ASSISTANT

## 2022-03-30 PROCEDURE — 80048 BASIC METABOLIC PNL TOTAL CA: CPT | Performed by: PHYSICIAN ASSISTANT

## 2022-03-30 PROCEDURE — 83721 ASSAY OF BLOOD LIPOPROTEIN: CPT | Mod: 59 | Performed by: PHYSICIAN ASSISTANT

## 2022-03-30 PROCEDURE — 11055 PARING/CUTG B9 HYPRKER LES 1: CPT | Performed by: PHYSICIAN ASSISTANT

## 2022-03-30 PROCEDURE — 99214 OFFICE O/P EST MOD 30 MIN: CPT | Mod: 25 | Performed by: PHYSICIAN ASSISTANT

## 2022-03-30 PROCEDURE — 36415 COLL VENOUS BLD VENIPUNCTURE: CPT | Performed by: PHYSICIAN ASSISTANT

## 2022-03-30 PROCEDURE — 82043 UR ALBUMIN QUANTITATIVE: CPT | Performed by: PHYSICIAN ASSISTANT

## 2022-03-30 PROCEDURE — 80061 LIPID PANEL: CPT | Performed by: PHYSICIAN ASSISTANT

## 2022-03-30 RX ORDER — GLIMEPIRIDE 4 MG/1
TABLET ORAL
Qty: 180 TABLET | Refills: 1 | Status: SHIPPED | OUTPATIENT
Start: 2022-03-30 | End: 2022-09-15

## 2022-03-30 RX ORDER — SIMVASTATIN 10 MG
TABLET ORAL
Qty: 90 TABLET | Refills: 3 | Status: SHIPPED | OUTPATIENT
Start: 2022-03-30 | End: 2023-03-29

## 2022-03-30 ASSESSMENT — PAIN SCALES - GENERAL: PAINLEVEL: NO PAIN (0)

## 2022-03-30 NOTE — PROGRESS NOTES
Assessment & Plan       ICD-10-CM    1. Type 2 diabetes mellitus with diabetic neuropathy, without long-term current use of insulin (H)  E11.40 Basic metabolic panel  (Ca, Cl, CO2, Creat, Gluc, K, Na, BUN)     Hemoglobin A1c     Albumin Random Urine Quantitative with Creat Ratio     Basic metabolic panel  (Ca, Cl, CO2, Creat, Gluc, K, Na, BUN)     Hemoglobin A1c     Albumin Random Urine Quantitative with Creat Ratio     metFORMIN (GLUCOPHAGE) 1000 MG tablet     glimepiride (AMARYL) 4 MG tablet     empagliflozin (JARDIANCE) 10 MG TABS tablet   2. Hyperlipidemia LDL goal <100  E78.5 Lipid panel reflex to direct LDL Fasting     Lipid panel reflex to direct LDL Fasting     simvastatin (ZOCOR) 10 MG tablet   3. Essential hypertension with goal blood pressure less than 130/80  I10 Basic metabolic panel  (Ca, Cl, CO2, Creat, Gluc, K, Na, BUN)     Basic metabolic panel  (Ca, Cl, CO2, Creat, Gluc, K, Na, BUN)   4. Callus of foot  L84 Paring/Shaving of a Single Lesion [84784]   5. Colon cancer screening  Z12.11 [facility]([employer])   6. Morbid obesity (H)  E66.01        1, 6. A1c increased from 7.2 to 8.3 since last visit 7 months ago. He admits to eating more refined carbohydrates and breads, which has likely contributed to the increase. We discussed increasing his Jardiance and working on balanced low carbohydrate meals. He prefers to focus on the dietary changes before increasing the Jardiance due to the urinary frequency and thirst he has been experiencing. Discussed how this may also be related to elevated blood glucose, further underlining the importance of good blood glucose control. He will return in 3 months to recheck his A1c. We will consider increasing the Jardiance at that time depending on A1c.     2. Obtained updated lipid panel today, continue simvastatin.     3. Blood pressure elevated today at 142/92. Continue to focus on low sodium foods.. Will recheck blood pressure at next visit in 3 months and  "address as needed.     4. Callus on the ball of his left foot causing mild discomfort. This was pared/shaved down with a 15 blade today in the clinic and he tolerated this well. Recommend home treatment consisting of nightly foot soaks followed by paring down the area with sandpaper or a pumice stone.    5. Cologuard kit will be sent to his house.        BMI:   Estimated body mass index is 47.6 kg/m  as calculated from the following:    Height as of this encounter: 1.727 m (5' 7.99\").    Weight as of this encounter: 142 kg (313 lb).   Weight management plan: Discussed healthy diet and exercise guidelines    Patient seen in conjunction with KIKE Hwang.     LIBERTY Luna [facility]    Kia Wilkes is a 61 year old who presents for the following health issues     History of Present Illness       Diabetes:   He presents for follow up of diabetes.  He is checking home blood glucose a few times a month. He checks blood glucose before meals.  Blood glucose is never over 200 and never under 70. He is aware of hypoglycemia symptoms including weakness and lethargy. He has no concerns regarding his diabetes at this time.  He is having numbness in feet and burning in feet.         He eats 2-3 servings of fruits and vegetables daily.He exercises with enough effort to increase his heart rate 10 to 19 minutes per day.  He exercises with enough effort to increase his heart rate 6 days per week.   He is taking medications regularly.     He reports he has not been focusing on his diet as closely over the past few months, eating more bread and refined carbohydrates. He has also not been checking blood glucose consistently at home. When he was checking a few months ago, reports he was averaging in the mid 140s upon waking. He continues with numbness and tingling in his feet and feels this is relatively stable. No changes in vision, he had an eye exam a few weeks ago.  He does endorse " "increased thirst and urinary frequency and believes this is related to the Jardiance.     He also reports he has an \"irritated nerve\" in his back which contributes to the numbness and tingling. He has been working with a chiropractor and has had modest benefit after the manipulations. He denies any significant back pain, no saddle anesthesias, bowel/bladder dysfunction.       Review of Systems   Constitutional, HEENT, cardiovascular, pulmonary, gi and gu systems are negative, except as otherwise noted.      Objective    BP (!) 142/88   Pulse 88   Temp 97.2  F (36.2  C) (Temporal)   Resp 20   Ht 1.727 m (5' 7.99\")   Wt 142 kg (313 lb)   SpO2 96%   BMI 47.60 kg/m    Body mass index is 47.6 kg/m .  Physical Exam   GENERAL: healthy, alert and no distress  EYES: Eyes grossly normal to inspection, PERRL and conjunctivae and sclerae normal  RESP: lungs clear to auscultation - no rales, rhonchi or wheezes  CV: regular rate and rhythm, normal S1 S2, no S3 or S4, no murmur, click or rub  ABDOMEN: soft, nontender, no hepatosplenomegaly, no masses and bowel sounds normal  MS: no gross musculoskeletal defects noted, non-pitting 1+ peripheral edema bilaterally.   SKIN: Large callus over plantar left foot beneath first MTP joint.  NEURO: Normal strength and tone, mentation intact and speech normal  PSYCH: mentation appears normal, affect normal/bright  Diabetic foot exam: no trophic changes or ulcerative lesions, reduced/absent sensation with monofilament exam bilaterally on #1-6, 9, 10. Normal sensation on #7 and 8.  Normal DP/PT except reduced left PT.               Lab Results   Component Value Date    A1C 8.3 03/30/2022    A1C 7.2 08/18/2021    A1C 8.5 05/19/2021    A1C 8.3 03/11/2021    A1C 8.1 09/23/2020    A1C 7.4 03/04/2020    A1C 9.2 11/22/2019               "

## 2022-03-30 NOTE — PATIENT INSTRUCTIONS
Work on a healthier diet and more active lifestyle.    Try a pumice stone or sandpaper on the callus.    Follow up in 3 months.

## 2022-05-14 DIAGNOSIS — I10 ESSENTIAL HYPERTENSION WITH GOAL BLOOD PRESSURE LESS THAN 130/80: ICD-10-CM

## 2022-05-17 ENCOUNTER — TELEPHONE (OUTPATIENT)
Dept: FAMILY MEDICINE | Facility: OTHER | Age: 62
End: 2022-05-17
Payer: COMMERCIAL

## 2022-05-17 ENCOUNTER — MYC MEDICAL ADVICE (OUTPATIENT)
Dept: FAMILY MEDICINE | Facility: OTHER | Age: 62
End: 2022-05-17
Payer: COMMERCIAL

## 2022-05-17 DIAGNOSIS — E11.40 TYPE 2 DIABETES MELLITUS WITH DIABETIC NEUROPATHY, WITHOUT LONG-TERM CURRENT USE OF INSULIN (H): Primary | ICD-10-CM

## 2022-05-17 RX ORDER — LISINOPRIL 40 MG/1
TABLET ORAL
Qty: 90 TABLET | Refills: 3 | Status: SHIPPED | OUTPATIENT
Start: 2022-05-17 | End: 2023-03-29

## 2022-05-17 NOTE — TELEPHONE ENCOUNTER
"Pending Prescriptions:                       Disp   Refills    lisinopril (ZESTRIL) 40 MG tablet [Pharmac*90 tab*3        Sig: TAKE ONE TABLET BY MOUTH ONCE DAILY    Routing refill request to provider for review/approval because:  BP    Requested Prescriptions   Pending Prescriptions Disp Refills    lisinopril (ZESTRIL) 40 MG tablet [Pharmacy Med Name: LISINOPRIL 40MG TABS] 90 tablet 3     Sig: TAKE ONE TABLET BY MOUTH ONCE DAILY        ACE Inhibitors (Including Combos) Protocol Failed - 5/14/2022  6:18 PM        Failed - Blood pressure under 140/90 in past 12 months       BP Readings from Last 3 Encounters:   03/30/22 (!) 142/88   08/18/21 138/68   06/17/21 134/82                 Passed - Recent (12 mo) or future (30 days) visit within the authorizing provider's specialty     Patient has had an office visit with the authorizing provider or a provider within the authorizing providers department within the previous 12 mos or has a future within next 30 days. See \"Patient Info\" tab in inbasket, or \"Choose Columns\" in Meds & Orders section of the refill encounter.              Passed - Medication is active on med list        Passed - Patient is age 18 or older        Passed - Normal serum creatinine on file in past 12 months     Recent Labs   Lab Test 03/30/22  1400   CR 1.05       Ok to refill medication if creatinine is low          Passed - Normal serum potassium on file in past 12 months     Recent Labs   Lab Test 03/30/22  1400   POTASSIUM 4.5                           "

## 2022-05-17 NOTE — TELEPHONE ENCOUNTER
Reason for Call:  Medication or medication refill:    Do you use a [facility]?  Name of the pharmacy and phone number for the current request:  [facility] - [phone]    Name of the medication requested: empagliflozin (JARDIANCE) 10 MG TABS tablet    Other request: pharmacy states this medication was discontinued by provider so they cannot refill it.     Pt states he was never taken off of it but they did talk at the last OV about eventually possibly increasing the dose.     Can we leave a detailed message on this number? YES    Phone number patient can be reached at: Work number on file:  [phone] (work)    Best Time:     Call taken on 5/17/2022 at 10:53 AM by Chely Perkins

## 2022-05-17 NOTE — TELEPHONE ENCOUNTER
He is correct, he should still be on Jardiance and in fact, will increase to 25 mg. New Rx sent.    Toy Martinez PA-C

## 2022-06-16 ENCOUNTER — OFFICE VISIT (OUTPATIENT)
Dept: FAMILY MEDICINE | Facility: OTHER | Age: 62
End: 2022-06-16
Payer: COMMERCIAL

## 2022-06-16 VITALS
HEART RATE: 75 BPM | WEIGHT: 306 LBS | SYSTOLIC BLOOD PRESSURE: 128 MMHG | TEMPERATURE: 97.9 F | OXYGEN SATURATION: 96 % | DIASTOLIC BLOOD PRESSURE: 72 MMHG | BODY MASS INDEX: 46.54 KG/M2

## 2022-06-16 DIAGNOSIS — E11.40 TYPE 2 DIABETES MELLITUS WITH DIABETIC NEUROPATHY, WITHOUT LONG-TERM CURRENT USE OF INSULIN (H): Primary | ICD-10-CM

## 2022-06-16 DIAGNOSIS — E66.01 MORBID OBESITY (H): ICD-10-CM

## 2022-06-16 DIAGNOSIS — E78.5 HYPERLIPIDEMIA LDL GOAL <100: ICD-10-CM

## 2022-06-16 LAB — HBA1C MFR BLD: 7.6 % (ref 0–5.6)

## 2022-06-16 PROCEDURE — 99214 OFFICE O/P EST MOD 30 MIN: CPT | Performed by: PHYSICIAN ASSISTANT

## 2022-06-16 PROCEDURE — 36415 COLL VENOUS BLD VENIPUNCTURE: CPT | Performed by: PHYSICIAN ASSISTANT

## 2022-06-16 PROCEDURE — 83036 HEMOGLOBIN GLYCOSYLATED A1C: CPT | Performed by: PHYSICIAN ASSISTANT

## 2022-06-16 ASSESSMENT — PAIN SCALES - GENERAL: PAINLEVEL: NO PAIN (0)

## 2022-06-16 NOTE — PATIENT INSTRUCTIONS
Your A1c is improving which is great.    I recommend more frequent walking.    Complete the Cologuard test.    Follow-up in 3 months.

## 2022-06-16 NOTE — PROGRESS NOTES
Assessment & Plan       ICD-10-CM    1. Type 2 diabetes mellitus with diabetic neuropathy, without long-term current use of insulin (H)  E11.40 Hemoglobin A1c     Hemoglobin A1c     blood glucose (NO BRAND SPECIFIED) lancets standard   2. Hyperlipidemia LDL goal <100  E78.5    3. Morbid obesity (H)  E66.01        1, 3.  Patient presents for diabetic follow-up.  Patient has been making improvements in his diet.  Patient's A1c has decreased since his last visit, today his A1c is 7.6 and at his last visit 3 months ago he was at 8.3.  I congratulated him on this and encouraged him to continue making lifestyle changes and recommend daily walking.  Continue current medication regimen.  Continue to check blood glucose daily. Recheck in 3 months.    2.  Patient has history of hyperlipidemia and hypertriglyceridemia.  Patient was not fasting today and we did not obtain a lipid panel.  Patient was instructed to come fasting to his next appointment to update a lipid profile to recheck his triglycerides. Continue simvastatin.    He planned to get the Pneumoccal 20 vaccine and Shingrix but support staff was running behind and he could not wait so will administer at next visit.    Patient verbally consented to the treatment plan.  Notify provider if questions or concerns arise.  Follow-up in 3 months for diabetes check or sooner if needed.    Toy Martinez PA-C  [facility]    [facility]  Formerly [facility], [facility]    Kia Wilkes is a 62 year old presenting for the following health issues:  Diabetes    Patient is a 62-year-old male presents for diabetes follow-up.  He says since his last visit he has been taking fish oil and improving his diet.  He has not been physically active.  He states that he checks his blood sugar once a day on average.  His readings at home he gets range from 133-190  Patient is requesting more lancets.  Patient reports no side effects from his medications and he  takes his medications as directed daily.    History of Present Illness       Diabetes:   He presents for follow up of diabetes.  He is checking home blood glucose one time daily. He checks blood glucose before meals.  Blood glucose is never over 200 and never under 70. He is aware of hypoglycemia symptoms including dizziness, weakness and lethargy. He has no concerns regarding his diabetes at this time.  He is having numbness in feet.         He eats 4 or more servings of fruits and vegetables daily.He consumes 0 sweetened beverage(s) daily.He exercises with enough effort to increase his heart rate 9 or less minutes per day.  He exercises with enough effort to increase his heart rate 4 days per week.   He is taking medications regularly.       Review of Systems   Constitutional, HEENT, cardiovascular, pulmonary, gi and gu systems are negative, except as otherwise noted.      Objective    /72   Pulse 75   Temp 97.9  F (36.6  C) (Temporal)   Wt 138.8 kg (306 lb)   SpO2 96%   BMI 46.54 kg/m    Body mass index is 46.54 kg/m .  Physical Exam   GENERAL: healthy, alert and no distress  EYES: Eyes grossly normal to inspection, PERRL and conjunctivae and sclerae normal  RESP: lungs clear to auscultation - no rales, rhonchi or wheezes  CV: regular rate and rhythm, normal S1 S2, no S3 or S4, no murmur, click or rub, trace lower extremity edema bilaterally, and peripheral pulses strong  PSYCH: mentation appears normal, affect normal/bright    Lab Results   Component Value Date    A1C 7.6 06/16/2022    A1C 8.3 03/30/2022    A1C 7.2 08/18/2021    A1C 8.5 05/19/2021    A1C 8.3     03/11/2021    A1C 8.1 09/23/2020    A1C 7.4 03/04/2020    A1C 9.2 11/22/2019         .  ..

## 2022-09-15 ENCOUNTER — OFFICE VISIT (OUTPATIENT)
Dept: FAMILY MEDICINE | Facility: OTHER | Age: 62
End: 2022-09-15
Payer: COMMERCIAL

## 2022-09-15 VITALS
BODY MASS INDEX: 45.77 KG/M2 | WEIGHT: 302 LBS | DIASTOLIC BLOOD PRESSURE: 80 MMHG | SYSTOLIC BLOOD PRESSURE: 148 MMHG | HEART RATE: 73 BPM | TEMPERATURE: 98.2 F | HEIGHT: 68 IN | OXYGEN SATURATION: 95 %

## 2022-09-15 DIAGNOSIS — E11.40 TYPE 2 DIABETES MELLITUS WITH DIABETIC NEUROPATHY, WITHOUT LONG-TERM CURRENT USE OF INSULIN (H): Primary | ICD-10-CM

## 2022-09-15 DIAGNOSIS — Z23 NEED FOR INFLUENZA VACCINATION: ICD-10-CM

## 2022-09-15 DIAGNOSIS — Z23 NEED FOR PNEUMOCOCCAL VACCINATION: ICD-10-CM

## 2022-09-15 DIAGNOSIS — I10 ESSENTIAL HYPERTENSION WITH GOAL BLOOD PRESSURE LESS THAN 130/80: ICD-10-CM

## 2022-09-15 LAB — HBA1C MFR BLD: 7.6 % (ref 0–5.6)

## 2022-09-15 PROCEDURE — 36415 COLL VENOUS BLD VENIPUNCTURE: CPT | Performed by: PHYSICIAN ASSISTANT

## 2022-09-15 PROCEDURE — 99214 OFFICE O/P EST MOD 30 MIN: CPT | Mod: 25 | Performed by: PHYSICIAN ASSISTANT

## 2022-09-15 PROCEDURE — 90472 IMMUNIZATION ADMIN EACH ADD: CPT | Performed by: PHYSICIAN ASSISTANT

## 2022-09-15 PROCEDURE — 83036 HEMOGLOBIN GLYCOSYLATED A1C: CPT | Performed by: PHYSICIAN ASSISTANT

## 2022-09-15 PROCEDURE — 90682 RIV4 VACC RECOMBINANT DNA IM: CPT | Performed by: PHYSICIAN ASSISTANT

## 2022-09-15 PROCEDURE — 90677 PCV20 VACCINE IM: CPT | Performed by: PHYSICIAN ASSISTANT

## 2022-09-15 PROCEDURE — 90471 IMMUNIZATION ADMIN: CPT | Performed by: PHYSICIAN ASSISTANT

## 2022-09-15 RX ORDER — GLIMEPIRIDE 4 MG/1
TABLET ORAL
Qty: 180 TABLET | Refills: 1 | Status: SHIPPED | OUTPATIENT
Start: 2022-09-15 | End: 2023-05-09

## 2022-09-15 ASSESSMENT — PAIN SCALES - GENERAL: PAINLEVEL: NO PAIN (0)

## 2022-09-15 NOTE — PATIENT INSTRUCTIONS
Your A1c is stable at 7.6 so continue to work on a healthier diet with more routine exercise.    Send in the cologuard.    Recheck in 3 months.

## 2022-09-15 NOTE — PROGRESS NOTES
Assessment & Plan       ICD-10-CM    1. Type 2 diabetes mellitus with diabetic neuropathy, without long-term current use of insulin (H)  E11.40 Hemoglobin A1c     Hemoglobin A1c     glimepiride (AMARYL) 4 MG tablet     metFORMIN (GLUCOPHAGE) 1000 MG tablet   2. Essential hypertension with goal blood pressure less than 130/80  I10    3. Need for influenza vaccination  Z23 INFLUENZA QUAD, RECOMBINANT, P-FREE (RIV4) (FLUBLOK) AGE 50-64 [GIS684]   4. Need for pneumococcal vaccination  Z23 Pneumococcal 20 Valent Conjugate (Prevnar 20)       1. Hemoglobin A1c is stable at 7.6 but still above goal. We discussed increasing his Jardiance to 25 mg but he prefers to work harder on lifestyle changes over the next 3 months including a healthier diet and more physical activity. He is slowly losing weight which is reassuring. Will recheck in 3 months.    2. His BP is elevated during both readings today. Recommend a low salt diet and nursing BP recheck in 2 weeks. If still elevated, will consider adding a beta blocker.    3-4. Vaccines administered by MA.    Follow-up in 3 months.       LIBERTY Luna [facility]    Kia Wilkes is a 62 year old, presenting for the following health issues:  Diabetes      History of Present Illness       Diabetes:   He presents for follow up of diabetes.  He is checking home blood glucose a few times a month. He checks blood glucose before meals.  Blood glucose is never over 200 and never under 70. He is aware of hypoglycemia symptoms including weakness. He has no concerns regarding his diabetes at this time.  He is having burning in feet.         He eats 2-3 servings of fruits and vegetables daily.He consumes 0 sweetened beverage(s) daily.He exercises with enough effort to increase his heart rate 10 to 19 minutes per day.    He is taking medications regularly.     He has been trying to be healthier overall but knows he can do a better job. He still has  "tingling in both feet along with pain when walking barefoot. As long as he wears good fitting shoes, his foot pain is well controlled.    Review of Systems   Constitutional, cardiovascular, pulmonary, neuro, gi and gu systems are negative, except as otherwise noted.      Objective    BP (!) 148/80 (Cuff Size: Adult Large)   Pulse 73   Temp 98.2  F (36.8  C) (Oral)   Ht 1.731 m (5' 8.15\")   Wt 137 kg (302 lb)   SpO2 95%   BMI 45.72 kg/m    Body mass index is 45.72 kg/m .  Physical Exam   GENERAL: healthy, alert and no distress  RESP: lungs clear to auscultation - no rales, rhonchi or wheezes  CV: regular rate and rhythm, normal S1 S2, no S3 or S4, no murmur, click or rub, no peripheral edema and peripheral pulses strong  SKIN: Callus over left first plantar MCP joint.   NEURO: Normal strength and tone, mentation intact and speech normal. Gait is stable.  PSYCH: mentation appears normal, affect normal/bright        Lab Results   Component Value Date    A1C 7.6 09/15/2022    A1C 7.6 06/16/2022    A1C 8.3 03/30/2022    A1C 7.2 08/18/2021    A1C 8.5 05/19/2021    A1C 8.3 03/11/2021    A1C 8.1 09/23/2020    A1C 7.4 03/04/2020    A1C 9.2 11/22/2019             "

## 2022-12-15 ENCOUNTER — OFFICE VISIT (OUTPATIENT)
Dept: FAMILY MEDICINE | Facility: OTHER | Age: 62
End: 2022-12-15
Payer: COMMERCIAL

## 2022-12-15 VITALS
OXYGEN SATURATION: 95 % | WEIGHT: 303 LBS | TEMPERATURE: 96.4 F | HEART RATE: 78 BPM | SYSTOLIC BLOOD PRESSURE: 134 MMHG | DIASTOLIC BLOOD PRESSURE: 80 MMHG | BODY MASS INDEX: 45.87 KG/M2

## 2022-12-15 DIAGNOSIS — Z12.11 SCREEN FOR COLON CANCER: ICD-10-CM

## 2022-12-15 DIAGNOSIS — Z23 NEED FOR SHINGLES VACCINE: ICD-10-CM

## 2022-12-15 DIAGNOSIS — E11.40 TYPE 2 DIABETES MELLITUS WITH DIABETIC NEUROPATHY, WITHOUT LONG-TERM CURRENT USE OF INSULIN (H): Primary | ICD-10-CM

## 2022-12-15 LAB
ANION GAP SERPL CALCULATED.3IONS-SCNC: 4 MMOL/L (ref 3–14)
BUN SERPL-MCNC: 15 MG/DL (ref 7–30)
CALCIUM SERPL-MCNC: 9.1 MG/DL (ref 8.5–10.1)
CHLORIDE BLD-SCNC: 106 MMOL/L (ref 94–109)
CO2 SERPL-SCNC: 31 MMOL/L (ref 20–32)
CREAT SERPL-MCNC: 0.9 MG/DL (ref 0.66–1.25)
GFR SERPL CREATININE-BSD FRML MDRD: >90 ML/MIN/1.73M2
GLUCOSE BLD-MCNC: 230 MG/DL (ref 70–99)
HBA1C MFR BLD: 8.2 % (ref 0–5.6)
POTASSIUM BLD-SCNC: 4.1 MMOL/L (ref 3.4–5.3)
SODIUM SERPL-SCNC: 141 MMOL/L (ref 133–144)

## 2022-12-15 PROCEDURE — 90471 IMMUNIZATION ADMIN: CPT | Performed by: PHYSICIAN ASSISTANT

## 2022-12-15 PROCEDURE — 83036 HEMOGLOBIN GLYCOSYLATED A1C: CPT | Performed by: PHYSICIAN ASSISTANT

## 2022-12-15 PROCEDURE — 90750 HZV VACC RECOMBINANT IM: CPT | Performed by: PHYSICIAN ASSISTANT

## 2022-12-15 PROCEDURE — 36415 COLL VENOUS BLD VENIPUNCTURE: CPT | Performed by: PHYSICIAN ASSISTANT

## 2022-12-15 PROCEDURE — 80048 BASIC METABOLIC PNL TOTAL CA: CPT | Performed by: PHYSICIAN ASSISTANT

## 2022-12-15 PROCEDURE — 99214 OFFICE O/P EST MOD 30 MIN: CPT | Mod: 25 | Performed by: PHYSICIAN ASSISTANT

## 2022-12-15 ASSESSMENT — PAIN SCALES - GENERAL: PAINLEVEL: NO PAIN (0)

## 2022-12-15 NOTE — PROGRESS NOTES
Assessment & Plan       ICD-10-CM    1. Type 2 diabetes mellitus with diabetic neuropathy, without long-term current use of insulin (H)  E11.40 Basic metabolic panel  (Ca, Cl, CO2, Creat, Gluc, K, Na, BUN)     Hemoglobin A1c     Basic metabolic panel  (Ca, Cl, CO2, Creat, Gluc, K, Na, BUN)     Hemoglobin A1c     empagliflozin (JARDIANCE) 25 MG TABS tablet      2. Screen for colon cancer  Z12.11       3. Need for shingles vaccine  Z23 ZOSTER VACCINE RECOMBINANT ADJUVANTED IM NJX          1. His hemoglobin A1c has risen from 7.6 three months ago to 8.2 today. He admits to eating more carbs. We had a long-discussion about healthy eating and weight loss. He knows that losing weight will help in all aspect of his health. We discussed that this is not an easy thing to lose weight but that he should start slow with one small goal at a time and as he gets into a routine, it will become easier. He will work on a lower carb diet and more active lifestyle. Will increase Jardiance to 25 mg and continues current doses of Amaryl and metformin. Will plan on recheck in 3 months.    2. He keeps forgetting to send in his Cologuard kit but states he will do it.    3. Vaccine administered by MA.    Follow-up in 3 months.      LIBERTY Luna [facility]    Kia Wilkes is a 62 year old, presenting for the following health issues:  Diabetes      History of Present Illness       Diabetes:   He presents for follow up of diabetes.  He is checking home blood glucose a few times a month. He checks blood glucose before meals.  Blood glucose is never over 200 and never under 70. He is aware of hypoglycemia symptoms including lethargy. He has no concerns regarding his diabetes at this time.  He is having numbness in feet and burning in feet.         He eats 2-3 servings of fruits and vegetables daily.He consumes 0 sweetened beverage(s) daily.He exercises with enough effort to increase his heart rate 9 or  less minutes per day.    He is taking medications regularly.       He has not been checking his glucose lately and admits to eating more carbs/breads. He states that he has worsening neuropathy in his feet when he eats high carbs.    Review of Systems   Constitutional, HEENT, cardiovascular, pulmonary, gi and gu systems are negative, except as otherwise noted.      Objective    /80 (BP Location: Right arm, Patient Position: Sitting, Cuff Size: Adult Large)   Pulse 78   Temp (!) 96.4  F (35.8  C) (Temporal)   Wt 137.4 kg (303 lb)   SpO2 95%   BMI 45.87 kg/m    Body mass index is 45.87 kg/m .  Physical Exam   GENERAL: healthy, alert and no distress  RESP: lungs clear to auscultation - no rales, rhonchi or wheezes  CV: regular rate and rhythm, normal S1 S2, no S3 or S4, no murmur, click or rub, no peripheral edema   NEURO: Normal strength and tone, mentation intact and speech normal. Gait is stable.  PSYCH: mentation appears normal, affect normal/bright        Lab Results   Component Value Date    A1C 8.2 12/15/2022    A1C 7.6 09/15/2022    A1C 7.6 06/16/2022    A1C 8.3 03/30/2022    A1C 7.2 08/18/2021    A1C 8.5 05/19/2021    A1C 8.3 03/11/2021    A1C 8.1 09/23/2020    A1C 7.4 03/04/2020    A1C 9.2 11/22/2019

## 2022-12-15 NOTE — PATIENT INSTRUCTIONS
Your A1c has slightly worsened to 8.2.  Will increase Jardiance to 25 mg.  Work on a healthier diet and more active lifestyle.    Follow-up in 3 months.

## 2023-02-18 ENCOUNTER — HEALTH MAINTENANCE LETTER (OUTPATIENT)
Age: 63
End: 2023-02-18

## 2023-03-20 ENCOUNTER — TRANSFERRED RECORDS (OUTPATIENT)
Dept: HEALTH INFORMATION MANAGEMENT | Facility: CLINIC | Age: 63
End: 2023-03-20
Payer: COMMERCIAL

## 2023-03-20 LAB — RETINOPATHY: NEGATIVE

## 2023-03-29 ENCOUNTER — OFFICE VISIT (OUTPATIENT)
Dept: FAMILY MEDICINE | Facility: OTHER | Age: 63
End: 2023-03-29
Payer: COMMERCIAL

## 2023-03-29 VITALS
WEIGHT: 292 LBS | RESPIRATION RATE: 24 BRPM | OXYGEN SATURATION: 96 % | TEMPERATURE: 97.3 F | SYSTOLIC BLOOD PRESSURE: 128 MMHG | HEIGHT: 69 IN | DIASTOLIC BLOOD PRESSURE: 62 MMHG | BODY MASS INDEX: 43.25 KG/M2 | HEART RATE: 74 BPM

## 2023-03-29 DIAGNOSIS — M54.41 CHRONIC MIDLINE LOW BACK PAIN WITH BILATERAL SCIATICA: ICD-10-CM

## 2023-03-29 DIAGNOSIS — M54.42 CHRONIC MIDLINE LOW BACK PAIN WITH BILATERAL SCIATICA: ICD-10-CM

## 2023-03-29 DIAGNOSIS — I10 ESSENTIAL HYPERTENSION WITH GOAL BLOOD PRESSURE LESS THAN 130/80: ICD-10-CM

## 2023-03-29 DIAGNOSIS — E11.40 TYPE 2 DIABETES MELLITUS WITH DIABETIC NEUROPATHY, WITHOUT LONG-TERM CURRENT USE OF INSULIN (H): Primary | ICD-10-CM

## 2023-03-29 DIAGNOSIS — E78.5 HYPERLIPIDEMIA LDL GOAL <100: ICD-10-CM

## 2023-03-29 DIAGNOSIS — G89.29 CHRONIC MIDLINE LOW BACK PAIN WITH BILATERAL SCIATICA: ICD-10-CM

## 2023-03-29 DIAGNOSIS — Z12.11 SCREEN FOR COLON CANCER: ICD-10-CM

## 2023-03-29 LAB
CHOLEST SERPL-MCNC: 140 MG/DL
CREAT UR-MCNC: 72.6 MG/DL
HBA1C MFR BLD: 7.7 % (ref 0–5.6)
HDLC SERPL-MCNC: 33 MG/DL
LDLC SERPL CALC-MCNC: 60 MG/DL
MICROALBUMIN UR-MCNC: 13.7 MG/L
MICROALBUMIN/CREAT UR: 18.87 MG/G CR (ref 0–17)
NONHDLC SERPL-MCNC: 107 MG/DL
TRIGL SERPL-MCNC: 237 MG/DL

## 2023-03-29 PROCEDURE — 99207 PR FOOT EXAM NO CHARGE: CPT | Performed by: PHYSICIAN ASSISTANT

## 2023-03-29 PROCEDURE — 36415 COLL VENOUS BLD VENIPUNCTURE: CPT | Performed by: PHYSICIAN ASSISTANT

## 2023-03-29 PROCEDURE — 80061 LIPID PANEL: CPT | Performed by: PHYSICIAN ASSISTANT

## 2023-03-29 PROCEDURE — 82043 UR ALBUMIN QUANTITATIVE: CPT | Performed by: PHYSICIAN ASSISTANT

## 2023-03-29 PROCEDURE — 99214 OFFICE O/P EST MOD 30 MIN: CPT | Performed by: PHYSICIAN ASSISTANT

## 2023-03-29 PROCEDURE — 82570 ASSAY OF URINE CREATININE: CPT | Performed by: PHYSICIAN ASSISTANT

## 2023-03-29 PROCEDURE — 83036 HEMOGLOBIN GLYCOSYLATED A1C: CPT | Performed by: PHYSICIAN ASSISTANT

## 2023-03-29 RX ORDER — SIMVASTATIN 10 MG
TABLET ORAL
Qty: 90 TABLET | Refills: 3 | Status: SHIPPED | OUTPATIENT
Start: 2023-03-29 | End: 2023-12-28

## 2023-03-29 RX ORDER — LISINOPRIL 40 MG/1
40 TABLET ORAL DAILY
Qty: 90 TABLET | Refills: 3 | Status: SHIPPED | OUTPATIENT
Start: 2023-03-29 | End: 2023-12-28

## 2023-03-29 NOTE — PATIENT INSTRUCTIONS
Your A1c is down to 7.7 which is going in the right direction.  Continue current medications and a healthier diet.    Follow-up in 3 months.

## 2023-03-29 NOTE — PROGRESS NOTES
Assessment & Plan       ICD-10-CM    1. Type 2 diabetes mellitus with diabetic neuropathy, without long-term current use of insulin (H)  E11.40 Hemoglobin A1c     Albumin Random Urine Quantitative with Creat Ratio     Lipid panel reflex to direct LDL Fasting     FOOT EXAM     Hemoglobin A1c     Albumin Random Urine Quantitative with Creat Ratio     Lipid panel reflex to direct LDL Fasting     metFORMIN (GLUCOPHAGE) 1000 MG tablet     empagliflozin (JARDIANCE) 25 MG TABS tablet      2. Hyperlipidemia LDL goal <100  E78.5 Lipid panel reflex to direct LDL Fasting     Lipid panel reflex to direct LDL Fasting     simvastatin (ZOCOR) 10 MG tablet      3. Essential hypertension with goal blood pressure less than 130/80  I10 lisinopril (ZESTRIL) 40 MG tablet      4. Chronic midline low back pain with bilateral sciatica  M54.41     M54.42     G89.29       5. Screen for colon cancer  Z12.11 Fecal colorectal cancer screen (FIT)     Fecal colorectal cancer screen (FIT)          1. His hemoglobin A1c has come down to 7.7 which is great. It is still not quite at goal but he has lost 10 pounds which is fantastic. I recommend he continue to work on a healthier, low carb diet more consistently. We talked about the [employer] jerrica on his phone to track calories and he will consider this. Will not make any medications changes and plan on recheck in 3 months.    2. Continue simvastatin. Updated lipid panel ordered.    3. Continue lisinopril as BP is stable.    4. He describes chronic, intermittent low back pain with intermittent pain and tingling into his legs. This has been stable for 20 years and he declines any updated imaging. If symptoms change/worsen, will order an MRI.     5. Updated FIT tested ordered.    Follow-up in 3 months    LIBERTY Luna [facility]    Kia Wilkes is a 62 year old, presenting for the following health issues:  Diabetes    Additional Questions 3/29/2023   Roomed by  "Verenice ALYSA   Accompanied by Self     History of Present Illness       Diabetes:   He presents for follow up of diabetes.  He is checking home blood glucose a few times a month. He checks blood glucose before meals.  Blood glucose is never over 200 and never under 70. He is aware of hypoglycemia symptoms including shakiness and lethargy. He has no concerns regarding his diabetes at this time.  He is having numbness in feet.         He eats 4 or more servings of fruits and vegetables daily.He consumes 0 sweetened beverage(s) daily.He exercises with enough effort to increase his heart rate 9 or less minutes per day.    He is taking medications regularly.     He wants information on continuous glucose monitor if it would be recommended. He has been working on a healthier diet and has lost some weight since his last visit, although the healthier diet is difficult to maintain. He did have a low glucose reading of 79 a few weeks ago but drank orange juice and felt better. He does not have frequent low readings.      Review of Systems   Constitutional, HEENT, cardiovascular, pulmonary, gi and gu systems are negative, except as otherwise noted.      Objective    /62 (BP Location: Right arm, Patient Position: Sitting, Cuff Size: Adult Large)   Pulse 74   Temp 97.3  F (36.3  C) (Temporal)   Resp 24   Ht 1.753 m (5' 9\")   Wt 132.5 kg (292 lb)   SpO2 96%   BMI 43.12 kg/m    Body mass index is 43.12 kg/m .  Physical Exam   GENERAL: healthy, alert and no distress  RESP: lungs clear to auscultation - no rales, rhonchi or wheezes  CV: regular rate and rhythm, normal S1 S2, no S3 or S4, no murmur, click or rub, no peripheral edema and peripheral pulses strong  NEURO: Normal strength and tone, mentation intact and speech normal. Gait is stable.  PSYCH: mentation appears normal, affect normal/bright  Diabetic foot exam: normal DP and PT pulses, no trophic changes or ulcerative lesions but there is a left plantar first MTP " callus. Decreased monofilament sensation over areas 1-6, 10 on the left and 1, 4-6, and 10 on the right.            Lab Results   Component Value Date    A1C 7.7 03/29/2023    A1C 8.2 12/15/2022    A1C 7.6 09/15/2022    A1C 7.6 06/16/2022    A1C 8.3 03/30/2022    A1C 8.5 05/19/2021    A1C 8.3 03/11/2021    A1C 8.1 09/23/2020    A1C 7.4 03/04/2020    A1C 9.2 11/22/2019

## 2023-05-07 DIAGNOSIS — E11.40 TYPE 2 DIABETES MELLITUS WITH DIABETIC NEUROPATHY, WITHOUT LONG-TERM CURRENT USE OF INSULIN (H): ICD-10-CM

## 2023-05-09 RX ORDER — GLIMEPIRIDE 4 MG/1
TABLET ORAL
Qty: 180 TABLET | Refills: 0 | Status: SHIPPED | OUTPATIENT
Start: 2023-05-09 | End: 2023-06-29

## 2023-06-29 ENCOUNTER — OFFICE VISIT (OUTPATIENT)
Dept: FAMILY MEDICINE | Facility: OTHER | Age: 63
End: 2023-06-29
Payer: COMMERCIAL

## 2023-06-29 VITALS
DIASTOLIC BLOOD PRESSURE: 80 MMHG | HEIGHT: 68 IN | OXYGEN SATURATION: 98 % | RESPIRATION RATE: 20 BRPM | WEIGHT: 295 LBS | HEART RATE: 64 BPM | BODY MASS INDEX: 44.71 KG/M2 | SYSTOLIC BLOOD PRESSURE: 130 MMHG | TEMPERATURE: 97.6 F

## 2023-06-29 DIAGNOSIS — E11.40 TYPE 2 DIABETES MELLITUS WITH DIABETIC NEUROPATHY, WITHOUT LONG-TERM CURRENT USE OF INSULIN (H): Primary | ICD-10-CM

## 2023-06-29 DIAGNOSIS — I10 ESSENTIAL HYPERTENSION WITH GOAL BLOOD PRESSURE LESS THAN 130/80: ICD-10-CM

## 2023-06-29 DIAGNOSIS — Z23 NEED FOR SHINGLES VACCINE: ICD-10-CM

## 2023-06-29 DIAGNOSIS — L57.0 ACTINIC KERATOSIS: ICD-10-CM

## 2023-06-29 LAB — HBA1C MFR BLD: 8.1 % (ref 0–5.6)

## 2023-06-29 PROCEDURE — 90471 IMMUNIZATION ADMIN: CPT | Performed by: PHYSICIAN ASSISTANT

## 2023-06-29 PROCEDURE — 90750 HZV VACC RECOMBINANT IM: CPT | Performed by: PHYSICIAN ASSISTANT

## 2023-06-29 PROCEDURE — 99214 OFFICE O/P EST MOD 30 MIN: CPT | Mod: 25 | Performed by: PHYSICIAN ASSISTANT

## 2023-06-29 PROCEDURE — 36415 COLL VENOUS BLD VENIPUNCTURE: CPT | Performed by: PHYSICIAN ASSISTANT

## 2023-06-29 PROCEDURE — 83036 HEMOGLOBIN GLYCOSYLATED A1C: CPT | Performed by: PHYSICIAN ASSISTANT

## 2023-06-29 RX ORDER — GLIMEPIRIDE 4 MG/1
8 TABLET ORAL
Qty: 180 TABLET | Refills: 1 | Status: SHIPPED | OUTPATIENT
Start: 2023-06-29 | End: 2023-12-28

## 2023-06-29 ASSESSMENT — PAIN SCALES - GENERAL: PAINLEVEL: NO PAIN (0)

## 2023-06-29 NOTE — PROGRESS NOTES
Assessment & Plan       ICD-10-CM    1. Type 2 diabetes mellitus with diabetic neuropathy, without long-term current use of insulin (H)  E11.40 Hemoglobin A1c     Hemoglobin A1c     glimepiride (AMARYL) 4 MG tablet      2. Essential hypertension with goal blood pressure less than 130/80  I10       3. Actinic keratosis  L57.0       4. Need for shingles vaccine  Z23 ZOSTER RECOMBINANT ADJUVANTED (SHINGRIX)          1. A1c has worsened to 8.1. from 7.7 three months ago. I discussed adding a GLP-1 like Ozempic or Rybelsus but he knows he can get numbers down with a more strict diet. He would like to work on this first and I also recommend a more active lifestyle. Will plan on recheck in 3 months.     2. Blood pressure is stable.    3. A few small actinic keratoses inside a solar lentigo over the right dorsal hand. We discussed monitoring versus cryotherapy today as the body can eliminate these on its own with time. He prefers monitoring so will recheck in 3 months and if still present, will treat with cryotherapy.    4. Vaccine administered by [facility]    Kia Wilkes is a 63 year old, presenting for the following health issues:  Diabetes        6/29/2023     4:47 PM   Additional Questions   Roomed by Ashli   Accompanied by self         6/29/2023     4:48 PM   Patient Reported Additional Medications   Patient reports taking the following new medications fish oil     History of Present Illness       Diabetes:   He presents for follow up of diabetes.  He is checking home blood glucose a few times a month. He checks blood glucose before meals.  Blood glucose is never over 200 and never under 70. He is aware of hypoglycemia symptoms including lethargy. He has no concerns regarding his diabetes at this time.  He is having numbness in feet. The patient has had a diabetic eye exam in the last 12 months. Eye exam performed on March 2023. Location of last eye exam  "[facility].        He eats 2-3 servings of fruits and vegetables daily.He exercises with enough effort to increase his heart rate 9 or less minutes per day.  He exercises with enough effort to increase his heart rate 3 or less days per week.   He is taking medications regularly.       He admits to eating more carbs over the past few months with minimal exercise.     He has a few bumps on his hand he would like evaluated. He has had a round, tan macule there for 5+ years but now has a few smaller bumps within this lesion that showed up within the past 1-2 months. No pain or itching.     Review of Systems   Constitutional, HEENT, cardiovascular, pulmonary, gi and gu systems are negative, except as otherwise noted.      Objective    /80   Pulse 64   Temp 97.6  F (36.4  C) (Temporal)   Resp 20   Ht 1.73 m (5' 8.11\")   Wt 133.8 kg (295 lb)   SpO2 98%   BMI 44.71 kg/m    Body mass index is 44.71 kg/m .  Physical Exam   GENERAL: healthy, alert and no distress  RESP: lungs clear to auscultation - no rales, rhonchi or wheezes  CV: regular rate and rhythm, normal S1 S2, no S3 or S4, no murmur, click or rub, no peripheral edema and peripheral pulses strong  NEURO: Normal strength and tone, mentation intact and speech normal. Gait is stable.   SKIN: round, tan macule over right dorsal hand. Two, small skin colored slightly rough papules ~1 mm noted inside the tan macule.   PSYCH: mentation appears normal, affect normal/bright    Lab Results   Component Value Date    A1C 8.1 06/29/2023    A1C 7.7 03/29/2023    A1C 8.2 12/15/2022    A1C 7.6 09/15/2022    A1C 7.6 06/16/2022    A1C 8.5 05/19/2021    A1C 8.3 03/11/2021    A1C 8.1 09/23/2020    A1C 7.4 03/04/2020    A1C 9.2 11/22/2019               "

## 2023-06-29 NOTE — PATIENT INSTRUCTIONS
Your A1c is up to 8.1.  Cut back on the carb loaded foods and stay active.  If not improving in 3 months, will consider Rybelsus.    Monitor the skin lesions on your hand.

## 2023-06-29 NOTE — PROGRESS NOTES
Prior to immunization administration, verified patients identity using patient s name and date of birth. Please see Immunization Activity for additional information.     Screening Questionnaire for Adult Immunization    Are you sick today?   No   Do you have allergies to medications, food, a vaccine component or latex?   No   Have you ever had a serious reaction after receiving a vaccination?   No   Do you have a long-term health problem with heart, lung, kidney, or metabolic disease (e.g., diabetes), asthma, a blood disorder, no spleen, complement component deficiency, a cochlear implant, or a spinal fluid leak?  Are you on long-term aspirin therapy?   No   Do you have cancer, leukemia, HIV/AIDS, or any other immune system problem?   No   Do you have a parent, brother, or sister with an immune system problem?   No   In the past 3 months, have you taken medications that affect  your immune system, such as prednisone, other steroids, or anticancer drugs; drugs for the treatment of rheumatoid arthritis, Crohn s disease, or psoriasis; or have you had radiation treatments?   No   Have you had a seizure, or a brain or other nervous system problem?   No   During the past year, have you received a transfusion of blood or blood    products, or been given immune (gamma) globulin or antiviral drug?   No   For women: Are you pregnant or is there a chance you could become       pregnant during the next month?   No   Have you received any vaccinations in the past 4 weeks?   No     Immunization questionnaire answers were all negative.      Patient instructed to remain in clinic for 15 minutes afterwards, and to report any adverse reactions.     Screening performed by Nae Hubbard CMA on 6/29/2023 at 5:23 PM.

## 2023-09-28 ENCOUNTER — OFFICE VISIT (OUTPATIENT)
Dept: FAMILY MEDICINE | Facility: OTHER | Age: 63
End: 2023-09-28
Payer: COMMERCIAL

## 2023-09-28 VITALS
OXYGEN SATURATION: 98 % | BODY MASS INDEX: 43.95 KG/M2 | HEART RATE: 65 BPM | TEMPERATURE: 97.3 F | HEIGHT: 68 IN | SYSTOLIC BLOOD PRESSURE: 134 MMHG | RESPIRATION RATE: 17 BRPM | WEIGHT: 290 LBS | DIASTOLIC BLOOD PRESSURE: 72 MMHG

## 2023-09-28 DIAGNOSIS — L57.0 ACTINIC KERATOSIS: ICD-10-CM

## 2023-09-28 DIAGNOSIS — Z23 NEED FOR PROPHYLACTIC VACCINATION AND INOCULATION AGAINST INFLUENZA: ICD-10-CM

## 2023-09-28 DIAGNOSIS — L81.4 SOLAR LENTIGO: ICD-10-CM

## 2023-09-28 DIAGNOSIS — E66.01 MORBID OBESITY (H): ICD-10-CM

## 2023-09-28 DIAGNOSIS — Z12.11 SCREEN FOR COLON CANCER: ICD-10-CM

## 2023-09-28 DIAGNOSIS — E11.40 TYPE 2 DIABETES MELLITUS WITH DIABETIC NEUROPATHY, WITHOUT LONG-TERM CURRENT USE OF INSULIN (H): Primary | ICD-10-CM

## 2023-09-28 LAB — HBA1C MFR BLD: 7.7 % (ref 0–5.6)

## 2023-09-28 PROCEDURE — 99214 OFFICE O/P EST MOD 30 MIN: CPT | Mod: 25 | Performed by: PHYSICIAN ASSISTANT

## 2023-09-28 PROCEDURE — 83036 HEMOGLOBIN GLYCOSYLATED A1C: CPT | Performed by: PHYSICIAN ASSISTANT

## 2023-09-28 PROCEDURE — 90471 IMMUNIZATION ADMIN: CPT | Performed by: PHYSICIAN ASSISTANT

## 2023-09-28 PROCEDURE — 90682 RIV4 VACC RECOMBINANT DNA IM: CPT | Performed by: PHYSICIAN ASSISTANT

## 2023-09-28 PROCEDURE — 36415 COLL VENOUS BLD VENIPUNCTURE: CPT | Performed by: PHYSICIAN ASSISTANT

## 2023-09-28 RX ORDER — ACYCLOVIR 400 MG/1
1 TABLET ORAL
Qty: 3 EACH | Refills: 5 | Status: SHIPPED | OUTPATIENT
Start: 2023-09-28 | End: 2024-03-18

## 2023-09-28 ASSESSMENT — PAIN SCALES - GENERAL: PAINLEVEL: NO PAIN (0)

## 2023-09-28 NOTE — COMMUNITY RESOURCES LIST (ENGLISH)
09/28/2023   [facility]  N/A  For questions about this resource list or additional care needs, please contact your primary care clinic or care manager.  Phone: [phone]   Email: N/A   Address: [address] [place] [address]   Hours: N/A        Financial Stability       Utility payment assistance  1  [facility] Services Office - [facility] [place] utility assistance Distance: 16.34 miles      In-Person   [address] [address]  Language: English  Hours: Mon - Fri 8:00 AM - 12:00 PM , Mon - Fri 1:00 PM - 4:00 PM  Fees: Free   Phone: (489) 933-4946 Email: [email or web address] Website: [email or web address]     2  [employer] [place] ([employer]) - Emergency Assistance Distance: 16.72 miles      In-Person   [address] [place] [address]  Language: English, Belarusian  Hours: Mon 10:00 AM - 1:30 PM Appt. Only, Wed 10:00 AM - 1:30 PM Appt. Only, Thu 4:30 PM - 6:30 PM Appt. Only, Fri 10:00 AM - 1:30 PM Appt. Only  Fees: Free   Phone: (157) 456-6170 Email: info@[employer].org Website: http://www.caerfoZMPhelf.org          Important Numbers & Websites       Emergency Services   911  [facility]   311  Poison Control   (214) 244-8151  Suicide Prevention Lifeline   (242) 354-3185 (TALK)  Child Abuse Hotline   (977) 141-5222 (4-A-Child)  Sexual Assault Hotline   (123) 178-2462 (HOPE)  National Runaway Safeline   (841) 993-1626 (RUNAWAY)  All-Options Talkline   (843) 241-4646  Substance Abuse Referral   (913) 790-4782 (HELP)

## 2023-09-28 NOTE — PROGRESS NOTES
Assessment & Plan       ICD-10-CM    1. Type 2 diabetes mellitus with diabetic neuropathy, without long-term current use of insulin (H)  E11.40 Hemoglobin A1c     Hemoglobin A1c     metFORMIN (GLUCOPHAGE) 1000 MG tablet     empagliflozin (JARDIANCE) 25 MG TABS tablet     Continuous Blood Gluc Sensor (DEXCOM G7 SENSOR) MISC      2. Morbid obesity (H)  E66.01       3. Screen for colon cancer  Z12.11 Fecal colorectal cancer screen FIT - Future (S+30)      4. Solar lentigo  L81.4       5. Actinic keratosis  L57.0       6. Need for prophylactic vaccination and inoculation against influenza  Z23 INFLUENZA QUAD, RECOMBINANT, P-FREE (RIV4) (FLUBLOK)          1-2. His hemoglobin A1c has improved to 7.7 and he has lost 5 pounds. He would like to start a continuous glucose monitor so will send over the DEXCOM 7 monitoring system. Continue current medications and recheck in 3 months. Continue to pursue a healthier diet and more routine exercise.     3. FIT testing ordered.    4-5. Right dorsal hand with solar lentigo (has increased in size over time) but is symmetric so will continue to monitor. He also has a few smaller papules, likely actinic keratoses versus seborrheic keratoses. He prefers continued monitoring rather than cryotherapy today.    6. Vaccine administered by MA.    Follow-up in 3 months.    LIBERTY [facility]    Kia Wilkes is a 63 year old, presenting for the following health issues:  Diabetes (Recheck)        9/28/2023     3:07 PM   Additional Questions   Roomed by Jamila   Accompanied by Self         9/28/2023     3:07 PM   Patient Reported Additional Medications   Patient reports taking the following new medications none       History of Present Illness       Diabetes:   He presents for follow up of diabetes.  He is checking home blood glucose a few times a month.   He checks blood glucose before meals.  Blood glucose is never over 200 and never under 70. He  "is aware of hypoglycemia symptoms including weakness.    He has no concerns regarding his diabetes at this time.  He is having numbness in feet.            He eats 2-3 servings of fruits and vegetables daily.He consumes 0 sweetened beverage(s) daily.He exercises with enough effort to increase his heart rate 9 or less minutes per day.  He exercises with enough effort to increase his heart rate 3 or less days per week.   He is taking medications regularly.     Patient would like to discuss continuous glucose monitors. His insurance will cover them.   Patient would like a flu shot today.    Hyperlipidemia Follow-Up    Are you regularly taking any medication or supplement to lower your cholesterol?   Yes- simvastatin 10 mg  Are you having muscle aches or other side effects that you think could be caused by your cholesterol lowering medication?  No    Hypertension Follow-up    Do you check your blood pressure regularly outside of the clinic? No   Are you following a low salt diet? No  Are your blood pressures ever more than 140 on the top number (systolic) OR more   than 90 on the bottom number (diastolic), for example 140/90? Patient doesn't check BP outside the clinic      Review of Systems   Constitutional, HEENT, cardiovascular, pulmonary, gi and gu systems are negative, except as otherwise noted.      Objective    /72   Pulse 65   Temp 97.3  F (36.3  C) (Temporal)   Resp 17   Ht 1.727 m (5' 8\")   Wt 131.5 kg (290 lb)   SpO2 98%   BMI 44.09 kg/m    Body mass index is 44.09 kg/m .  Physical Exam   GENERAL: healthy, alert and no distress  RESP: lungs clear to auscultation - no rales, rhonchi or wheezes  CV: regular rate and rhythm, normal S1 S2, no S3 or S4, no murmur, click or rub, no peripheral edema  SKIN: Tan, round macule over right dorsal wrist measuring 1.5 x 1.5 cm. A smaller, ~0.2 cm pink, scaly papule in the center and similar lesion more radial on the dorsal right wrist.   NEURO: Normal strength " and tone, mentation intact and speech normal. Gait is stable.   PSYCH: mentation appears normal, affect normal/bright    Lab Results   Component Value Date    A1C 7.7 09/28/2023    A1C 8.1 06/29/2023    A1C 7.7 03/29/2023    A1C 8.2 12/15/2022    A1C 7.6 09/15/2022    A1C 8.5 05/19/2021    A1C 8.3 03/11/2021    A1C 8.1 09/23/2020    A1C 7.4 03/04/2020    A1C 9.2 11/22/2019

## 2023-09-28 NOTE — COMMUNITY RESOURCES LIST (ENGLISH)
09/28/2023   [facility] - Outpatient Clinics  N/A  For additional resource needs, please contact your health insurance member services or your primary care team.  Phone: [phone]   Email: N/A   Address: [facility] [place] [address]   Hours: N/A        Financial Stability       Utility payment assistance  1  [place] [facility] and [facility] Distance: 49.99 miles      In-Person, Phone/Virtual   [address] [place] [address]  Language: English  Hours: Mon - Fri 8:30 AM - 4:30 PM  Fees: Free   Phone: (906) 652-7065 Website: https://[place]/HerBabyShower/energy/consumer-assistance/energy-assistance-program/     2  [place] [employer] [facility] [place]'s Telephone Assistance Plan (TAP) and [facility] Lifeline and Affordable Connectivity Program (ACP) Distance: 52.35 miles      Phone/Virtual   [address] [place] [address]  Language: English  Fees: Free   Phone: (528) 814-9212 Email: [email or web address].[email or web address]. Website: https://[place]/puc/consumers/telephone/          Important Numbers & Websites       [facility]   211 211unitedway.org  Poison Control   (474) 259-8257 [place]po[place]  Suicide and Crisis Lifeline   [address] [place], [place] [address]line.org  Childhelp [place] Child Abuse Hotline   503.386.9302 Childhelphotline.org  National Sexual Assault Hotline   (532) 435-5969 (HOPE) Rainn.org  National Runaway Safeline   (876) 690-5504 (RUNAWAY) 1800runaway.org  Pregnancy & Postpartum Support [place]   Call/text 046-459-6902 Ppsupportmn.org  Substance Abuse National Helpline ([facility]   908-463-HELP (4988) Findtreatment.gov  Emergency Services   911

## 2023-09-28 NOTE — PATIENT INSTRUCTIONS
A1c is down to 7.7 which is an improvement.  Continue with a low carb diet and active lifestyle.  Will prescribe the DEXCOM continuous glucose monitor.    Follow-up in 3 months.

## 2023-12-28 ENCOUNTER — OFFICE VISIT (OUTPATIENT)
Dept: FAMILY MEDICINE | Facility: OTHER | Age: 63
End: 2023-12-28
Payer: COMMERCIAL

## 2023-12-28 VITALS
RESPIRATION RATE: 17 BRPM | HEART RATE: 66 BPM | BODY MASS INDEX: 41.99 KG/M2 | SYSTOLIC BLOOD PRESSURE: 130 MMHG | DIASTOLIC BLOOD PRESSURE: 80 MMHG | TEMPERATURE: 97.5 F | WEIGHT: 283.5 LBS | HEIGHT: 69 IN | OXYGEN SATURATION: 99 %

## 2023-12-28 DIAGNOSIS — E11.40 TYPE 2 DIABETES MELLITUS WITH DIABETIC NEUROPATHY, WITHOUT LONG-TERM CURRENT USE OF INSULIN (H): Primary | ICD-10-CM

## 2023-12-28 DIAGNOSIS — I10 ESSENTIAL HYPERTENSION WITH GOAL BLOOD PRESSURE LESS THAN 130/80: ICD-10-CM

## 2023-12-28 DIAGNOSIS — E78.5 HYPERLIPIDEMIA LDL GOAL <100: ICD-10-CM

## 2023-12-28 LAB
ANION GAP SERPL CALCULATED.3IONS-SCNC: 15 MMOL/L (ref 7–15)
BUN SERPL-MCNC: 19.4 MG/DL (ref 8–23)
CALCIUM SERPL-MCNC: 9.6 MG/DL (ref 8.8–10.2)
CHLORIDE SERPL-SCNC: 100 MMOL/L (ref 98–107)
CREAT SERPL-MCNC: 0.92 MG/DL (ref 0.67–1.17)
DEPRECATED HCO3 PLAS-SCNC: 25 MMOL/L (ref 22–29)
EGFRCR SERPLBLD CKD-EPI 2021: >90 ML/MIN/1.73M2
GLUCOSE SERPL-MCNC: 95 MG/DL (ref 70–99)
HBA1C MFR BLD: 6.6 % (ref 0–5.6)
POTASSIUM SERPL-SCNC: 4.2 MMOL/L (ref 3.4–5.3)
SODIUM SERPL-SCNC: 140 MMOL/L (ref 135–145)

## 2023-12-28 PROCEDURE — 99214 OFFICE O/P EST MOD 30 MIN: CPT | Performed by: PHYSICIAN ASSISTANT

## 2023-12-28 PROCEDURE — 36415 COLL VENOUS BLD VENIPUNCTURE: CPT | Performed by: PHYSICIAN ASSISTANT

## 2023-12-28 PROCEDURE — 83036 HEMOGLOBIN GLYCOSYLATED A1C: CPT | Performed by: PHYSICIAN ASSISTANT

## 2023-12-28 PROCEDURE — 80048 BASIC METABOLIC PNL TOTAL CA: CPT | Performed by: PHYSICIAN ASSISTANT

## 2023-12-28 RX ORDER — SIMVASTATIN 10 MG
TABLET ORAL
Qty: 90 TABLET | Refills: 3 | Status: SHIPPED | OUTPATIENT
Start: 2023-12-28

## 2023-12-28 RX ORDER — BLOOD SUGAR DIAGNOSTIC
STRIP MISCELLANEOUS
Qty: 100 STRIP | Refills: 5 | Status: SHIPPED | OUTPATIENT
Start: 2023-12-28 | End: 2024-06-27

## 2023-12-28 RX ORDER — LISINOPRIL 40 MG/1
40 TABLET ORAL DAILY
Qty: 90 TABLET | Refills: 3 | Status: SHIPPED | OUTPATIENT
Start: 2023-12-28

## 2023-12-28 RX ORDER — GLIMEPIRIDE 4 MG/1
4 TABLET ORAL
Qty: 90 TABLET | Refills: 1 | Status: SHIPPED | OUTPATIENT
Start: 2023-12-28 | End: 2023-12-28

## 2023-12-28 RX ORDER — GLIMEPIRIDE 4 MG/1
4 TABLET ORAL
Qty: 90 TABLET | Refills: 1 | Status: SHIPPED | OUTPATIENT
Start: 2023-12-28 | End: 2024-03-28

## 2023-12-28 ASSESSMENT — PAIN SCALES - GENERAL: PAINLEVEL: NO PAIN (0)

## 2023-12-28 NOTE — PATIENT INSTRUCTIONS
Decreased glimepiride to 4 mg (1 tablet).  Your A1c is down to 6.6 which is great!    Follow-up in 6 months.

## 2023-12-28 NOTE — PROGRESS NOTES
Assessment & Plan       ICD-10-CM    1. Type 2 diabetes mellitus with diabetic neuropathy, without long-term current use of insulin (H)  E11.40 Hemoglobin A1c     Basic metabolic panel  (Ca, Cl, CO2, Creat, Gluc, K, Na, BUN)     Hemoglobin A1c     Basic metabolic panel  (Ca, Cl, CO2, Creat, Gluc, K, Na, BUN)     blood glucose (ACCU-CHEK SAMANTHA PLUS) test strip     blood glucose (NO BRAND SPECIFIED) lancets standard     empagliflozin (JARDIANCE) 25 MG TABS tablet     glimepiride (AMARYL) 4 MG tablet     metFORMIN (GLUCOPHAGE) 1000 MG tablet     DISCONTINUED: glimepiride (AMARYL) 4 MG tablet      2. Essential hypertension with goal blood pressure less than 130/80  I10 lisinopril (ZESTRIL) 40 MG tablet      3. Hyperlipidemia LDL goal <100  E78.5 simvastatin (ZOCOR) 10 MG tablet          1. His hemoglobin A1c is down to 6.6 which is fantastic. He is actually getting more hypoglycemic episodes so will decrease glimepiride from 8 mg to 4 mg daily. Continue Jardiance and metformin. The DEXCOM is working very well. Recheck in 6 months.    2. Blood pressure initially elevated but was improved on 2nd recheck. Continue lisinopril.    3. Continue simvastatin.          Toy Martinez PA-C  [facility]    Kia Wilkes is a 63 year old, presenting for the following health issues:  Recheck Medication (3 month follow up)        12/28/2023     2:48 PM   Additional Questions   Roomed by Jamila   Accompanied by Self         12/28/2023     2:48 PM   Patient Reported Additional Medications   Patient reports taking the following new medications none       History of Present Illness       Diabetes:   He presents for follow up of diabetes.   He is checking home blood glucose with a continuous glucose monitor.   He checks blood glucose before and after meals and at bedtime.  Blood glucose is sometimes over 200 and sometimes under 70. He is aware of hypoglycemia symptoms including weakness and lethargy.   He  is concerned about low blood sugar, several less than 70 in the past few weeks.   He is having numbness in feet.            He eats 2-3 servings of fruits and vegetables daily.He consumes 0 sweetened beverage(s) daily.He exercises with enough effort to increase his heart rate 9 or less minutes per day.  He exercises with enough effort to increase his heart rate 3 or less days per week.   He is taking medications regularly.     He has been noticing more low glucose readings the past few months, often 80 or below as he has really been focusing on a healthier diet, especially with the new DEXCOM meter. He is currently at 82 here in the clinic and feels slightly lightheaded. He continues to lose weight.    Hyperlipidemia Follow-Up    Are you regularly taking any medication or supplement to lower your cholesterol?   Yes- simvastatin 10 mg  Are you having muscle aches or other side effects that you think could be caused by your cholesterol lowering medication?  No    Hypertension Follow-up    Do you check your blood pressure regularly outside of the clinic? No   Are you following a low salt diet? Yes no added salt  Are your blood pressures ever more than 140 on the top number (systolic) OR more   than 90 on the bottom number (diastolic), for example 140/90? Patient does not check blood pressure outside the clinic    BP Readings from Last 2 Encounters:   12/28/23 130/80   09/28/23 134/72     Hemoglobin A1C (%)   Date Value   12/28/2023 6.6 (H)   09/28/2023 7.7 (H)   05/19/2021 8.5 (H)   03/11/2021 8.3 (H)     LDL Cholesterol Calculated   Date Value   03/29/2023 60 mg/dL   03/30/2022      Comment:     Cannot estimate LDL when triglyceride exceeds 400 mg/dL   03/11/2021 48 mg/dL   03/04/2020 55 mg/dL     LDL Cholesterol Direct (mg/dL)   Date Value   03/30/2022 97         Review of Systems   Constitutional, HEENT, cardiovascular, pulmonary, gi and gu systems are negative, except as otherwise noted.      Objective    /80  "  Pulse 66   Temp 97.5  F (36.4  C) (Temporal)   Resp 17   Ht 1.753 m (5' 9\")   Wt 128.6 kg (283 lb 8 oz)   SpO2 99%   BMI 41.87 kg/m    Body mass index is 41.87 kg/m .  Physical Exam   GENERAL: healthy, alert and no distress  RESP: lungs clear to auscultation - no rales, rhonchi or wheezes  CV: regular rate and rhythm, normal S1 S2, no S3 or S4, no murmur, click or rub  NEURO: Normal strength and tone, mentation intact and speech normal. Gait is stable.   PSYCH: mentation appears normal, affect normal/bright      Lab Results   Component Value Date    A1C 6.6 12/28/2023    A1C 7.7 09/28/2023    A1C 8.1 06/29/2023    A1C 7.7 03/29/2023    A1C 8.2 12/15/2022    A1C 8.5 05/19/2021    A1C 8.3 03/11/2021    A1C 8.1 09/23/2020    A1C 7.4 03/04/2020    A1C 9.2 11/22/2019               "

## 2024-02-21 ENCOUNTER — MYC MEDICAL ADVICE (OUTPATIENT)
Dept: FAMILY MEDICINE | Facility: OTHER | Age: 64
End: 2024-02-21
Payer: COMMERCIAL

## 2024-03-16 ENCOUNTER — HEALTH MAINTENANCE LETTER (OUTPATIENT)
Age: 64
End: 2024-03-16

## 2024-03-18 DIAGNOSIS — E11.40 TYPE 2 DIABETES MELLITUS WITH DIABETIC NEUROPATHY, WITHOUT LONG-TERM CURRENT USE OF INSULIN (H): ICD-10-CM

## 2024-03-18 RX ORDER — ACYCLOVIR 400 MG/1
1 TABLET ORAL
Qty: 3 EACH | Refills: 5 | Status: SHIPPED | OUTPATIENT
Start: 2024-03-18 | End: 2024-06-18

## 2024-03-21 ENCOUNTER — TRANSFERRED RECORDS (OUTPATIENT)
Dept: HEALTH INFORMATION MANAGEMENT | Facility: CLINIC | Age: 64
End: 2024-03-21
Payer: COMMERCIAL

## 2024-03-21 LAB — RETINOPATHY: NEGATIVE

## 2024-03-28 ENCOUNTER — OFFICE VISIT (OUTPATIENT)
Dept: FAMILY MEDICINE | Facility: OTHER | Age: 64
End: 2024-03-28
Payer: COMMERCIAL

## 2024-03-28 VITALS
BODY MASS INDEX: 39.55 KG/M2 | WEIGHT: 267 LBS | SYSTOLIC BLOOD PRESSURE: 118 MMHG | HEART RATE: 60 BPM | TEMPERATURE: 98 F | OXYGEN SATURATION: 98 % | HEIGHT: 69 IN | DIASTOLIC BLOOD PRESSURE: 62 MMHG | RESPIRATION RATE: 20 BRPM

## 2024-03-28 DIAGNOSIS — E66.01 MORBID OBESITY (H): ICD-10-CM

## 2024-03-28 DIAGNOSIS — E11.40 TYPE 2 DIABETES MELLITUS WITH DIABETIC NEUROPATHY, WITHOUT LONG-TERM CURRENT USE OF INSULIN (H): Primary | ICD-10-CM

## 2024-03-28 DIAGNOSIS — Z12.11 SCREEN FOR COLON CANCER: ICD-10-CM

## 2024-03-28 PROCEDURE — 99207 PR FOOT EXAM NO CHARGE: CPT | Performed by: PHYSICIAN ASSISTANT

## 2024-03-28 PROCEDURE — 99214 OFFICE O/P EST MOD 30 MIN: CPT | Performed by: PHYSICIAN ASSISTANT

## 2024-03-28 ASSESSMENT — PAIN SCALES - GENERAL: PAINLEVEL: MILD PAIN (2)

## 2024-03-28 NOTE — PROGRESS NOTES
"  Assessment & Plan       ICD-10-CM    1. Type 2 diabetes mellitus with diabetic neuropathy, without long-term current use of insulin (H)  E11.40 FOOT EXAM      2. Morbid obesity (H)  E66.01       3. Screen for colon cancer  Z12.11 Fecal colorectal cancer screen FIT - Future (S+30)     Fecal colorectal cancer screen FIT - Future (S+30)        1-2 .Diabetes is overall going well. He is continuing to lose weight and attempting to live a healthy lifestyle. Foot exam was appropriate outside of location where the tool hit his foot. Will continue to monitor area and informed him to watch for signs of infection and to schedule a follow-up if concerns arise. Will discontinue glimepiride and have him report blood sugar levels in 1-2 weeks to assess need to restart low dose glimepiride. Otherwise encouraged to continue to be physically active and eat small frequent meals. Follow-up scheduled in 3 months.    3. Reorder as last test was not submitted due to error.    Patient was seen in conjunction with ANAND Shaffer-S2      BMI  Estimated body mass index is 39.43 kg/m  as calculated from the following:    Height as of this encounter: 1.753 m (5' 9\").    Weight as of this encounter: 121.1 kg (267 lb).   Weight management plan: Discussed healthy diet and exercise guidelines        Kia Wilkes is a 63 year old, presenting for the following health issues:  Diabetes (Recheck)    Last seen in December where his A1c levels were 6.6 and he was experiencing episodes of hypoglycemia so his glimepiride was decreased from 8 mg to to 4 mg daily. Today, he states he is still experiencing episodes of hypoglycemia even though he tries to prevent it from going too far below 100. States episodes are more likely to occur on the weekends when he is busy with activities. Per continuous glucose monitor, he has been within range 99% of the days in the last 30 days. Does experience numbness and tingling in his feet at night, but these " "symptoms are not new. Does feel that it is more difficult to keep his hands and feet warm and believes that may be related to blood flow. Additionally had a recent vision exam that showed no concerns related to his diabetes. He would like another FIT exam as he states he \"messed up the other one.\"           3/28/2024     2:38 PM   Additional Questions   Roomed by Jamila   Accompanied by Self         3/28/2024     2:38 PM   Patient Reported Additional Medications   Patient reports taking the following new medications none     History of Present Illness       Diabetes:   He presents for follow up of diabetes.   He is checking home blood glucose with a continuous glucose monitor.   He checks blood glucose before and after meals.  Blood glucose is never over 200 and sometimes under 70. He is aware of hypoglycemia symptoms including weakness and lethargy.   He is concerned about low blood sugar, several less than 70 in the past few weeks.   He is having numbness in feet and burning in feet.            He eats 2-3 servings of fruits and vegetables daily.He consumes 0 sweetened beverage(s) daily.He exercises with enough effort to increase his heart rate 9 or less minutes per day.  He exercises with enough effort to increase his heart rate 3 or less days per week.   He is taking medications regularly.   * Patient would like to discuss lowering his diabetes medications.        Review of Systems  Constitutional, HEENT, cardiovascular, pulmonary, gi and gu systems are negative, except as otherwise noted.      Objective    /62   Pulse 60   Temp 98  F (36.7  C) (Temporal)   Resp 20   Ht 1.753 m (5' 9\")   Wt 121.1 kg (267 lb)   SpO2 98%   BMI 39.43 kg/m    Body mass index is 39.43 kg/m .  Physical Exam   GENERAL: alert and no distress  Respiratory: Normal lung sounds. No rhonchi, wheezing, or rales.  Cardiac: Regular rate and rhythm. Normal S1 and S2, no S3 or S4, murmurs, gallops, or rubs auscultated.  MS: no " gross musculoskeletal defects noted, no edema  Diabetic foot exam: normal DP and PT pulses, no trophic changes or ulcerative lesions, and normal sensory exam. Does have erythematous region on top of left foot where he states he dropped a tool on his foot.    Lab Results   Component Value Date    A1C 6.6 12/28/2023    A1C 7.7 09/28/2023    A1C 8.1 06/29/2023    A1C 7.7 03/29/2023    A1C 8.2 12/15/2022    A1C 8.5 05/19/2021    A1C 8.3 03/11/2021    A1C 8.1 09/23/2020    A1C 7.4 03/04/2020    A1C 9.2 11/22/2019             Signed Electronically by: Toy Martinez PA-C

## 2024-03-28 NOTE — PATIENT INSTRUCTIONS
Will stop the glimepiride and recommend you send me a message in 1-2 weeks to see what sugars are running.    Continue with routine exercise and small, frequent meals.    Follow-up in 3 months.

## 2024-06-17 ENCOUNTER — MYC MEDICAL ADVICE (OUTPATIENT)
Dept: FAMILY MEDICINE | Facility: OTHER | Age: 64
End: 2024-06-17
Payer: COMMERCIAL

## 2024-06-17 ENCOUNTER — TELEPHONE (OUTPATIENT)
Dept: FAMILY MEDICINE | Facility: OTHER | Age: 64
End: 2024-06-17
Payer: COMMERCIAL

## 2024-06-17 DIAGNOSIS — E11.40 TYPE 2 DIABETES MELLITUS WITH DIABETIC NEUROPATHY, WITHOUT LONG-TERM CURRENT USE OF INSULIN (H): ICD-10-CM

## 2024-06-17 DIAGNOSIS — E11.40 TYPE 2 DIABETES MELLITUS WITH DIABETIC NEUROPATHY, WITHOUT LONG-TERM CURRENT USE OF INSULIN (H): Primary | ICD-10-CM

## 2024-06-17 NOTE — TELEPHONE ENCOUNTER
Continuous Blood Gluc Sensor (DEXCOM G7 SENSOR) MISC     Prior Authorization Retail Medication Request    Medication/Dose: Continuous Blood Gluc Sensor (DEXCOM G7 SENSOR) MISC  Diagnosis and ICD code (if different than what is on RX):    New/renewal/insurance change PA/secondary ins. PA:  Previously Tried and Failed:    Rationale:      Insurance   Primary:   Insurance ID:      Secondary (if applicable):  Insurance ID:      Pharmacy Information (if different than what is on RX)  Name:    Phone:    Fax:    Key: [id number]

## 2024-06-18 RX ORDER — ACYCLOVIR 400 MG/1
1 TABLET ORAL
Qty: 3 EACH | Refills: 5 | Status: SHIPPED | OUTPATIENT
Start: 2024-06-18 | End: 2024-06-27

## 2024-06-19 NOTE — TELEPHONE ENCOUNTER
Called pharmacy and they said the sensors are requiring a prior authorization. Will route this to the team.    Daniella Anderson RN on 6/19/2024 at 5:19 PM

## 2024-06-20 NOTE — TELEPHONE ENCOUNTER
"Retail Pharmacy Prior Authorization Team   Phone: [phone]      INFORMATION FROM [employer] MESSAGE ROUTED TO THE MAIN POOL BY CLINIC SENT HIGH PRIORITY:        Dexcom 7 prescription  Received: Yesterday  Daniella Anderson RN  P [facility]  Phone Number: [phone]            Previous Messages    Dexcom 7 prescription  (Newest Message First)  View All Conversations on this Encounter   Daniella Anderson RN routed conversation to [facility] hours ago (5:19 PM)     Daniella Anderson RN15 hours ago (5:19 PM)     KI  Called pharmacy and they said the sensors are requiring a prior authorization. Will route this to the team.     Daniella Anderson RN on 6/19/2024 at 5:19 PM               Note     Daniella Anderson RN  [address] [place] [address] [address] [phone] hours ago (5:16 PM)     [facility] (supporting Toy Martinez PA-C)15 hours ago (5:03 PM)     JN  The pharmacy is saying that the insurance company is requiring \"prior authorization.\" The pharmacy said they faxed the request to your office.        LIBERTY Jean15 hours ago (4:48 PM)     JR Wilkes,     It looks as if additional sensors were sent out for you on June 18th. Please check with your pharmacy.      Germain Ramesh PA-C on 6/19/2024 at 4:48 PM        Ingrid Aguayo RN routed conversation to Toy Martinez PA-C2 days ago     [facility] (supporting Toy Martinez PA-C)2 days ago     JN  Just so you know, I am out of Dexcom sensors until a new prescription is filled.       Agustina Leon, RN routed conversation to Germain Ramesh PA-C2 days ago     Agustina Leon RN2 days ago     HE  Last office visit 3/28         Note     [facility] (supporting Toy Martinez PA-C)3 days ago     JN  The pharmacy informed me that my new insurer " is questioning my Dexcom prescription,  I was wondering if they would approve the Jessica system?

## 2024-06-20 NOTE — TELEPHONE ENCOUNTER
Retail Pharmacy Prior Authorization Team   Phone: [phone]      Please check the telephone encounter that was created as it will contain the updates on the prior authorization.

## 2024-06-21 RX ORDER — BLOOD-GLUCOSE SENSOR
1 EACH MISCELLANEOUS
Qty: 6 EACH | Refills: 3 | Status: SHIPPED | OUTPATIENT
Start: 2024-06-21

## 2024-06-21 RX ORDER — KETOROLAC TROMETHAMINE 30 MG/ML
1 INJECTION, SOLUTION INTRAMUSCULAR; INTRAVENOUS DAILY
Qty: 1 EACH | Refills: 0 | Status: SHIPPED | OUTPATIENT
Start: 2024-06-21

## 2024-06-21 NOTE — TELEPHONE ENCOUNTER
PRIOR AUTHORIZATION DENIED    Medication: DEXCOM G7 SENSOR MISC  Insurance Company: [employer] ([facility]) - Phone [phone] Fax [phone]  Denial Date: 6/20/2024  Denial Reason(s):     Appeal Information:     Patient Notified: No, care team must notify on denials.

## 2024-06-25 ENCOUNTER — TELEPHONE (OUTPATIENT)
Dept: FAMILY MEDICINE | Facility: OTHER | Age: 64
End: 2024-06-25
Payer: COMMERCIAL

## 2024-06-25 NOTE — TELEPHONE ENCOUNTER
Patient Quality Outreach    Patient is due for the following:   Colon Cancer Screening  Physical Preventive Adult Physical    Next Steps:   Schedule a Adult Preventative    Type of outreach:    Chart review performed, no outreach needed.      Questions for provider review:    None           Jenny Tobias, CMA

## 2024-06-27 ENCOUNTER — OFFICE VISIT (OUTPATIENT)
Dept: FAMILY MEDICINE | Facility: OTHER | Age: 64
End: 2024-06-27
Payer: COMMERCIAL

## 2024-06-27 VITALS
TEMPERATURE: 97 F | RESPIRATION RATE: 20 BRPM | BODY MASS INDEX: 37.18 KG/M2 | HEIGHT: 69 IN | OXYGEN SATURATION: 99 % | WEIGHT: 251 LBS | DIASTOLIC BLOOD PRESSURE: 70 MMHG | SYSTOLIC BLOOD PRESSURE: 126 MMHG | HEART RATE: 66 BPM

## 2024-06-27 DIAGNOSIS — E78.5 HYPERLIPIDEMIA LDL GOAL <100: ICD-10-CM

## 2024-06-27 DIAGNOSIS — E66.01 MORBID OBESITY (H): ICD-10-CM

## 2024-06-27 DIAGNOSIS — E11.40 TYPE 2 DIABETES MELLITUS WITH DIABETIC NEUROPATHY, WITHOUT LONG-TERM CURRENT USE OF INSULIN (H): Primary | ICD-10-CM

## 2024-06-27 DIAGNOSIS — I10 ESSENTIAL HYPERTENSION WITH GOAL BLOOD PRESSURE LESS THAN 130/80: ICD-10-CM

## 2024-06-27 LAB
CHOLEST SERPL-MCNC: 126 MG/DL
CREAT UR-MCNC: 75.2 MG/DL
FASTING STATUS PATIENT QL REPORTED: NO
HBA1C MFR BLD: 6.4 % (ref 0–5.6)
HDLC SERPL-MCNC: 34 MG/DL
LDLC SERPL CALC-MCNC: 62 MG/DL
MICROALBUMIN UR-MCNC: <12 MG/L
MICROALBUMIN/CREAT UR: NORMAL MG/G{CREAT}
NONHDLC SERPL-MCNC: 92 MG/DL
TRIGL SERPL-MCNC: 151 MG/DL

## 2024-06-27 PROCEDURE — 82043 UR ALBUMIN QUANTITATIVE: CPT | Performed by: PHYSICIAN ASSISTANT

## 2024-06-27 PROCEDURE — 80061 LIPID PANEL: CPT | Performed by: PHYSICIAN ASSISTANT

## 2024-06-27 PROCEDURE — 83036 HEMOGLOBIN GLYCOSYLATED A1C: CPT | Performed by: PHYSICIAN ASSISTANT

## 2024-06-27 PROCEDURE — 99214 OFFICE O/P EST MOD 30 MIN: CPT | Performed by: PHYSICIAN ASSISTANT

## 2024-06-27 PROCEDURE — 82570 ASSAY OF URINE CREATININE: CPT | Performed by: PHYSICIAN ASSISTANT

## 2024-06-27 PROCEDURE — G2211 COMPLEX E/M VISIT ADD ON: HCPCS | Performed by: PHYSICIAN ASSISTANT

## 2024-06-27 PROCEDURE — 36415 COLL VENOUS BLD VENIPUNCTURE: CPT | Performed by: PHYSICIAN ASSISTANT

## 2024-06-27 RX ORDER — BLOOD-GLUCOSE SENSOR
1 EACH MISCELLANEOUS
Qty: 6 EACH | Refills: 3 | Status: CANCELLED | OUTPATIENT
Start: 2024-06-27

## 2024-06-27 RX ORDER — KETOROLAC TROMETHAMINE 30 MG/ML
1 INJECTION, SOLUTION INTRAMUSCULAR; INTRAVENOUS DAILY
Qty: 1 EACH | Refills: 0 | Status: CANCELLED | OUTPATIENT
Start: 2024-06-27

## 2024-06-27 RX ORDER — BLOOD SUGAR DIAGNOSTIC
STRIP MISCELLANEOUS
Qty: 100 STRIP | Refills: 5 | Status: SHIPPED | OUTPATIENT
Start: 2024-06-27

## 2024-06-27 RX ORDER — SIMVASTATIN 10 MG
TABLET ORAL
Qty: 90 TABLET | Refills: 3 | Status: CANCELLED | OUTPATIENT
Start: 2024-06-27

## 2024-06-27 RX ORDER — LISINOPRIL 40 MG/1
40 TABLET ORAL DAILY
Qty: 90 TABLET | Refills: 3 | Status: CANCELLED | OUTPATIENT
Start: 2024-06-27

## 2024-06-27 ASSESSMENT — PAIN SCALES - GENERAL: PAINLEVEL: NO PAIN (0)

## 2024-06-27 NOTE — PATIENT INSTRUCTIONS
Your A1c is down to 6.4 and your weight is down as well so keep up the good work.    Follow-up in 6 months.

## 2024-06-27 NOTE — PROGRESS NOTES
Assessment & Plan       ICD-10-CM    1. Type 2 diabetes mellitus with diabetic neuropathy, without long-term current use of insulin (H)  E11.40 Albumin Random Urine Quantitative with Creat Ratio     Hemoglobin A1c     Albumin Random Urine Quantitative with Creat Ratio     Hemoglobin A1c     blood glucose (ACCU-CHEK SAMANTHA PLUS) test strip     blood glucose (NO BRAND SPECIFIED) lancets standard     empagliflozin (JARDIANCE) 25 MG TABS tablet     metFORMIN (GLUCOPHAGE) 1000 MG tablet      2. Hyperlipidemia LDL goal <100  E78.5 Lipid panel reflex to direct LDL Fasting     Lipid panel reflex to direct LDL Fasting      3. Essential hypertension with goal blood pressure less than 130/80  I10       4. Morbid obesity (H)  E66.01           1, 4. His hemoglobin A1c is down to 6.4 which is fantastic and his weight his down to 251 which is another 16 pounds since last visit which is fantastic. He has more energy and is being more active. Will continue current medications and plan on recheck in 6 months.    Wt Readings from Last 4 Encounters:   06/27/24 113.9 kg (251 lb)   03/28/24 121.1 kg (267 lb)   12/28/23 128.6 kg (283 lb 8 oz)   09/28/23 131.5 kg (290 lb)     2. Continue simvastatin.    3. Continue lisinopril.    The longitudinal plan of care for the diagnosis(es)/condition(s) as documented were addressed during this visit. Due to the added complexity in care, I will continue to support Chung in the subsequent management and with ongoing continuity of care.    Subjective   Chung is a 64 year old, presenting for the following health issues:  Diabetes, Lipids, and Hypertension      6/27/2024     2:29 PM   Additional Questions   Roomed by Jenny SAL     History of Present Illness       Diabetes:   He presents for follow up of diabetes.   He is checking home blood glucose with a continuous glucose monitor.   He checks blood glucose before meals, after meals, before and after meals and at bedtime.  Blood glucose is never over  "200 and never under 70. He is aware of hypoglycemia symptoms including shakiness and weakness.    He has no concerns regarding his diabetes at this time.  He is having numbness in feet.            He eats 2-3 servings of fruits and vegetables daily.He consumes 0 sweetened beverage(s) daily.He exercises with enough effort to increase his heart rate 9 or less minutes per day.  He exercises with enough effort to increase his heart rate 3 or less days per week.   He is taking medications regularly.     He feels his diabetes remains well controlled. He changed insurances to DEXCOM 7 is no longer covered but Freestyle Jessica is covered.    Hyperlipidemia Follow-Up    Are you regularly taking any medication or supplement to lower your cholesterol?   Yes- simvastatin  Are you having muscle aches or other side effects that you think could be caused by your cholesterol lowering medication?  No    Hypertension Follow-up    Do you check your blood pressure regularly outside of the clinic? No   Are you following a low salt diet? No  Are your blood pressures ever more than 140 on the top number (systolic) OR more   than 90 on the bottom number (diastolic), for example 140/90? No          Review of Systems  Constitutional, HEENT, cardiovascular, pulmonary, gi and gu systems are negative, except as otherwise noted.      Objective    /70   Pulse 66   Temp 97  F (36.1  C) (Temporal)   Resp 20   Ht 1.74 m (5' 8.5\")   Wt 113.9 kg (251 lb)   SpO2 99%   BMI 37.60 kg/m    Body mass index is 37.6 kg/m .  Physical Exam   GENERAL: alert and no distress  RESP: lungs clear to auscultation - no rales, rhonchi or wheezes  CV: regular rate and rhythm, normal S1 S2, no S3 or S4, no murmur, click or rub, no peripheral edema  NEURO: Normal strength and tone, mentation intact and speech normal. Gait is stable.   PSYCH: mentation appears normal, affect normal/bright        Lab Results   Component Value Date    A1C 6.4 06/27/2024    A1C 6.6 " 12/28/2023    A1C 7.7 09/28/2023    A1C 8.1 06/29/2023    A1C 7.7 03/29/2023    A1C 8.5 05/19/2021    A1C 8.3 03/11/2021    A1C 8.1 09/23/2020    A1C 7.4 03/04/2020    A1C 9.2 11/22/2019         Signed Electronically by: Toy Martinez PA-C

## 2024-11-21 ENCOUNTER — MYC MEDICAL ADVICE (OUTPATIENT)
Dept: FAMILY MEDICINE | Facility: OTHER | Age: 64
End: 2024-11-21
Payer: COMMERCIAL

## 2024-11-21 DIAGNOSIS — E11.40 TYPE 2 DIABETES MELLITUS WITH DIABETIC NEUROPATHY, WITHOUT LONG-TERM CURRENT USE OF INSULIN (H): Primary | ICD-10-CM

## 2024-11-25 ENCOUNTER — IMMUNIZATION (OUTPATIENT)
Dept: FAMILY MEDICINE | Facility: OTHER | Age: 64
End: 2024-11-25
Payer: COMMERCIAL

## 2024-11-25 DIAGNOSIS — Z23 ENCOUNTER FOR IMMUNIZATION: Primary | ICD-10-CM

## 2024-11-25 PROCEDURE — 99207 PR NO CHARGE NURSE ONLY: CPT

## 2024-11-25 PROCEDURE — 90656 IIV3 VACC NO PRSV 0.5 ML IM: CPT

## 2024-11-25 PROCEDURE — 90471 IMMUNIZATION ADMIN: CPT

## 2024-11-25 NOTE — PROGRESS NOTES
Prior to immunization administration, verified patients identity using patient s name and date of birth. Please see Immunization Activity for additional information.     Is the patient's temperature normal (100.5 or less)? Yes 96.8    Patient MEETS CRITERIA. PROCEED with vaccine administration.      Patient instructed to remain in clinic for 15 minutes afterwards, and to report any adverse reactions.        Screening performed by Lizzie Dale CMA on 11/25/2024 at 2:13 PM.

## 2024-12-19 ENCOUNTER — OFFICE VISIT (OUTPATIENT)
Dept: FAMILY MEDICINE | Facility: OTHER | Age: 64
End: 2024-12-19
Payer: COMMERCIAL

## 2024-12-19 VITALS
HEART RATE: 86 BPM | BODY MASS INDEX: 38.36 KG/M2 | RESPIRATION RATE: 20 BRPM | DIASTOLIC BLOOD PRESSURE: 70 MMHG | HEIGHT: 69 IN | TEMPERATURE: 97.3 F | SYSTOLIC BLOOD PRESSURE: 128 MMHG | WEIGHT: 259 LBS | OXYGEN SATURATION: 99 %

## 2024-12-19 DIAGNOSIS — Z12.5 SCREENING FOR PROSTATE CANCER: ICD-10-CM

## 2024-12-19 DIAGNOSIS — E11.40 TYPE 2 DIABETES MELLITUS WITH DIABETIC NEUROPATHY, WITHOUT LONG-TERM CURRENT USE OF INSULIN (H): Primary | ICD-10-CM

## 2024-12-19 DIAGNOSIS — I10 ESSENTIAL HYPERTENSION WITH GOAL BLOOD PRESSURE LESS THAN 130/80: ICD-10-CM

## 2024-12-19 DIAGNOSIS — E66.01 MORBID OBESITY (H): ICD-10-CM

## 2024-12-19 DIAGNOSIS — Z12.11 SCREEN FOR COLON CANCER: ICD-10-CM

## 2024-12-19 DIAGNOSIS — E78.5 HYPERLIPIDEMIA LDL GOAL <100: ICD-10-CM

## 2024-12-19 LAB
ANION GAP SERPL CALCULATED.3IONS-SCNC: 12 MMOL/L (ref 7–15)
BUN SERPL-MCNC: 15 MG/DL (ref 8–23)
CALCIUM SERPL-MCNC: 9.4 MG/DL (ref 8.8–10.4)
CHLORIDE SERPL-SCNC: 102 MMOL/L (ref 98–107)
CREAT SERPL-MCNC: 0.95 MG/DL (ref 0.67–1.17)
EGFRCR SERPLBLD CKD-EPI 2021: 89 ML/MIN/1.73M2
EST. AVERAGE GLUCOSE BLD GHB EST-MCNC: 160 MG/DL
GLUCOSE SERPL-MCNC: 139 MG/DL (ref 70–99)
HBA1C MFR BLD: 7.2 % (ref 0–5.6)
HCO3 SERPL-SCNC: 26 MMOL/L (ref 22–29)
POTASSIUM SERPL-SCNC: 4.4 MMOL/L (ref 3.4–5.3)
PSA SERPL DL<=0.01 NG/ML-MCNC: 0.49 NG/ML (ref 0–4.5)
SODIUM SERPL-SCNC: 140 MMOL/L (ref 135–145)

## 2024-12-19 RX ORDER — ACYCLOVIR 800 MG/1
1 TABLET ORAL
Qty: 6 EACH | Refills: 3 | Status: SHIPPED | OUTPATIENT
Start: 2024-12-19

## 2024-12-19 RX ORDER — SIMVASTATIN 10 MG
TABLET ORAL
Qty: 90 TABLET | Refills: 3 | Status: SHIPPED | OUTPATIENT
Start: 2024-12-19

## 2024-12-19 RX ORDER — LISINOPRIL 40 MG/1
40 TABLET ORAL DAILY
Qty: 90 TABLET | Refills: 3 | Status: SHIPPED | OUTPATIENT
Start: 2024-12-19

## 2024-12-19 RX ORDER — BLOOD SUGAR DIAGNOSTIC
STRIP MISCELLANEOUS
Qty: 100 STRIP | Refills: 5 | Status: SHIPPED | OUTPATIENT
Start: 2024-12-19

## 2024-12-19 ASSESSMENT — PAIN SCALES - GENERAL: PAINLEVEL_OUTOF10: NO PAIN (0)

## 2024-12-19 NOTE — PATIENT INSTRUCTIONS
Your A1c has slightly worsened to 7.2.  Continue to work on a healthier diet and more routine exercise.   Follow-up in 6 months. Send in the FIT test.

## 2024-12-19 NOTE — PROGRESS NOTES
Assessment & Plan       ICD-10-CM    1. Type 2 diabetes mellitus with diabetic neuropathy, without long-term current use of insulin (H)  E11.40 Hemoglobin A1c     Basic metabolic panel  (Ca, Cl, CO2, Creat, Gluc, K, Na, BUN)     Hemoglobin A1c     Basic metabolic panel  (Ca, Cl, CO2, Creat, Gluc, K, Na, BUN)     blood glucose (ACCU-CHEK SAMANTHA PLUS) test strip     blood glucose (NO BRAND SPECIFIED) lancets standard     Continuous Glucose Sensor (FREESTYLE CRISTO 3 SENSOR) MISC     empagliflozin (JARDIANCE) 25 MG TABS tablet     metFORMIN (GLUCOPHAGE) 1000 MG tablet      2. Essential hypertension with goal blood pressure less than 130/80  I10 lisinopril (ZESTRIL) 40 MG tablet      3. Hyperlipidemia LDL goal <100  E78.5 simvastatin (ZOCOR) 10 MG tablet      4. Screen for colon cancer  Z12.11       5. Morbid obesity (H)  E66.01       6. Screening for prostate cancer  Z12.5 PSA, screen     PSA, screen          1, 5. His A1c is slightly increased to 7.2 but he knows he has not been eating as well. Will not make any medication changes but he will change his diet and try to walk more. Will plan on recheck in 6 months.     2. BP is stable on lisinopril.    3. Continue simvastatin.    4. He will bring in his FIT test.    6. PSA screening ordered.    The longitudinal plan of care for the diagnosis(es)/condition(s) as documented were addressed during this visit. Due to the added complexity in care, I will continue to support Chung in the subsequent management and with ongoing continuity of care.    Subjective   Chung is a 64 year old, presenting for the following health issues:  Diabetes        12/19/2024     2:32 PM   Additional Questions   Roomed by Jenny SAL     History of Present Illness       Diabetes:   He presents for follow up of diabetes.   He is checking home blood glucose with a continuous glucose monitor.   He checks blood glucose before and after meals.  Blood glucose is sometimes over 200 and never under 70. He  "is aware of hypoglycemia symptoms including weakness and lethargy.    He has no concerns regarding his diabetes at this time.  He is having numbness in feet.            He eats 2-3 servings of fruits and vegetables daily.He consumes 0 sweetened beverage(s) daily.He exercises with enough effort to increase his heart rate 9 or less minutes per day.  He exercises with enough effort to increase his heart rate 3 or less days per week.   He is taking medications regularly.     He has not been watching what he is eating as much lately. His goal is to get down to a weight of 200 pounds so he can get off some of his medications.       Review of Systems  Constitutional, HEENT, cardiovascular, pulmonary, gi and gu systems are negative, except as otherwise noted.      Objective    /70   Pulse 86   Temp 97.3  F (36.3  C) (Temporal)   Resp 20   Ht 1.753 m (5' 9.02\")   Wt 117.5 kg (259 lb)   SpO2 99%   BMI 38.23 kg/m    Body mass index is 38.23 kg/m .  Physical Exam   GENERAL: alert and no distress  RESP: lungs clear to auscultation - no rales, rhonchi or wheezes  CV: regular rate and rhythm, normal S1 S2, no S3 or S4, no murmur, click or rub, no peripheral edema  NEURO: Normal strength and tone, mentation intact and speech normal. Gait is stable.  PSYCH: mentation appears normal, affect normal/bright    Lab Results   Component Value Date    A1C 7.2 12/19/2024    A1C 6.4 06/27/2024    A1C 6.6 12/28/2023    A1C 7.7 09/28/2023    A1C 8.1 06/29/2023    A1C 8.5 05/19/2021    A1C 8.3 03/11/2021    A1C 8.1 09/23/2020    A1C 7.4 03/04/2020    A1C 9.2 11/22/2019           Signed Electronically by: Toy Martinez PA-C    "

## 2025-03-22 ENCOUNTER — HEALTH MAINTENANCE LETTER (OUTPATIENT)
Age: 65
End: 2025-03-22

## 2025-06-14 ENCOUNTER — HEALTH MAINTENANCE LETTER (OUTPATIENT)
Age: 65
End: 2025-06-14

## 2025-06-19 ENCOUNTER — OFFICE VISIT (OUTPATIENT)
Dept: FAMILY MEDICINE | Facility: OTHER | Age: 65
End: 2025-06-19
Payer: COMMERCIAL

## 2025-06-19 ENCOUNTER — RESULTS FOLLOW-UP (OUTPATIENT)
Dept: FAMILY MEDICINE | Facility: OTHER | Age: 65
End: 2025-06-19

## 2025-06-19 VITALS
TEMPERATURE: 97.2 F | HEIGHT: 69 IN | OXYGEN SATURATION: 97 % | DIASTOLIC BLOOD PRESSURE: 82 MMHG | SYSTOLIC BLOOD PRESSURE: 128 MMHG | BODY MASS INDEX: 39.69 KG/M2 | WEIGHT: 268 LBS | RESPIRATION RATE: 20 BRPM | HEART RATE: 58 BPM

## 2025-06-19 DIAGNOSIS — L84 CALLUS OF FOOT: ICD-10-CM

## 2025-06-19 DIAGNOSIS — E78.5 HYPERLIPIDEMIA LDL GOAL <100: ICD-10-CM

## 2025-06-19 DIAGNOSIS — Z12.11 SCREEN FOR COLON CANCER: ICD-10-CM

## 2025-06-19 DIAGNOSIS — Z12.5 SCREENING FOR PROSTATE CANCER: ICD-10-CM

## 2025-06-19 DIAGNOSIS — E66.01 MORBID OBESITY (H): ICD-10-CM

## 2025-06-19 DIAGNOSIS — E11.42 TYPE 2 DIABETES MELLITUS WITH DIABETIC POLYNEUROPATHY, WITHOUT LONG-TERM CURRENT USE OF INSULIN (H): ICD-10-CM

## 2025-06-19 DIAGNOSIS — H66.3X2 CHRONIC SUPPURATIVE OTITIS MEDIA OF LEFT EAR, UNSPECIFIED OTITIS MEDIA LOCATION: ICD-10-CM

## 2025-06-19 DIAGNOSIS — I10 ESSENTIAL HYPERTENSION WITH GOAL BLOOD PRESSURE LESS THAN 130/80: ICD-10-CM

## 2025-06-19 DIAGNOSIS — Z00.00 WELCOME TO MEDICARE PREVENTIVE VISIT: Primary | ICD-10-CM

## 2025-06-19 DIAGNOSIS — H93.8X2 EAR CANAL MASS, LEFT: ICD-10-CM

## 2025-06-19 LAB
CHOLEST SERPL-MCNC: 140 MG/DL
CREAT UR-MCNC: 29.5 MG/DL
EST. AVERAGE GLUCOSE BLD GHB EST-MCNC: 203 MG/DL
FASTING STATUS PATIENT QL REPORTED: YES
HBA1C MFR BLD: 8.7 % (ref 0–5.6)
HDLC SERPL-MCNC: 37 MG/DL
LDLC SERPL CALC-MCNC: 76 MG/DL
MICROALBUMIN UR-MCNC: <12 MG/L
MICROALBUMIN/CREAT UR: NORMAL MG/G{CREAT}
NONHDLC SERPL-MCNC: 103 MG/DL
PSA SERPL DL<=0.01 NG/ML-MCNC: 0.49 NG/ML (ref 0–4.5)
TRIGL SERPL-MCNC: 134 MG/DL

## 2025-06-19 RX ORDER — HYDROCHLOROTHIAZIDE 12.5 MG/1
CAPSULE ORAL
Qty: 6 EACH | Refills: 3 | Status: SHIPPED | OUTPATIENT
Start: 2025-06-19

## 2025-06-19 RX ORDER — OFLOXACIN 3 MG/ML
5 SOLUTION AURICULAR (OTIC) 2 TIMES DAILY
Qty: 10 ML | Refills: 0 | Status: SHIPPED | OUTPATIENT
Start: 2025-06-19 | End: 2025-06-26

## 2025-06-19 ASSESSMENT — PAIN SCALES - GENERAL: PAINLEVEL_OUTOF10: NO PAIN (0)

## 2025-06-19 NOTE — PROGRESS NOTES
Assessment & Plan       ICD-10-CM    1. Welcome to Medicare preventive visit  Z00.00       2. Type 2 diabetes mellitus with diabetic polyneuropathy, without long-term current use of insulin (H)  E11.42 FOOT EXAM     Hemoglobin A1c     Albumin Random Urine Quantitative with Creat Ratio     empagliflozin (JARDIANCE) 25 MG TABS tablet     metFORMIN (GLUCOPHAGE) 1000 MG tablet     Hemoglobin A1c     Albumin Random Urine Quantitative with Creat Ratio     Continuous Glucose Sensor (FREESTYLE CRISTO 3 PLUS SENSOR) MISC     Paring/Shaving of a Single Lesion [50138]      3. Morbid obesity (H)  E66.01       4. Callus of foot  L84 Paring/Shaving of a Single Lesion [44160]      5. Essential hypertension with goal blood pressure less than 130/80  I10       6. Hyperlipidemia LDL goal <100  E78.5 Lipid panel reflex to direct LDL Fasting     Lipid panel reflex to direct LDL Fasting      7. Chronic suppurative otitis media of left ear, unspecified otitis media location  H66.3X2 Adult ENT  Referral     amoxicillin-clavulanate (AUGMENTIN) 875-125 MG tablet     ofloxacin (FLOXIN) 0.3 % otic solution      8. Ear canal mass, left  H93.8X2 Adult ENT  Referral      9. Screening for prostate cancer  Z12.5 PSA, screen     PSA, screen      10. Screen for colon cancer  Z12.11 Fecal colorectal cancer screen (FIT)     Fecal colorectal cancer screen (FIT)          1. Welcome to Medicare visit completed. He will follow-up for an eye exam as he is overdue for his diabetic eye exam.    2-3. His diabetic control has worsened with an A1c of 8.7 today as he has been eating poorly since he retired last year. I recommend a healthier, lower carb diet and will try the Freestyle Cristo 3+ since the Cristo 3 has not been working for patient lately so he has not been using it. I also discussed trying a GLP-1 medication like Ozempic or Mounjaro that can help with weight loss along with improved diabetes and renal/cardiac protection. We  "discussed potential side effects, especially GI side effects, and he will think about his options. He has a history of gallstones that apparently led to pancreatitis many years ago but he had a subsequent cholecystectomy with no issues since. He will continue Jardiance and metformin and I recommend more routine exercise. Will plan on recheck in 3 months.    4. I was able to pare down the callus with a 10 blade and then a 15 blade. He tolerated this well and was instructed on home warm water soaks and pumice stone treatment.    5. Blood pressure is stable.    6. Updated fasting lipid panel ordered. Continue simvastatin.    7-8. He has chronic left otitis media with history of chronic perforation and previous mastoidectomy. Will treat current findings with Ofloxacin and Augmentin (he states he has previously tolerated amoxicillin) and will refer back to ENT.     9. PSA screening ordered.    10. Updated FIT testing ordered.      The longitudinal plan of care for the diagnosis(es)/condition(s) as documented were addressed during this visit. Due to the added complexity in care, I will continue to support Chung in the subsequent management and with ongoing continuity of care.          BMI  Estimated body mass index is 39.56 kg/m  as calculated from the following:    Height as of this encounter: 1.753 m (5' 9.02\").    Weight as of this encounter: 121.6 kg (268 lb).   Weight management plan: Discussed healthy diet and exercise guidelines  Reviewed preventive health counseling, as reflected in patient instructions       Consider AAA screening for ages 65-75 and > 100 cig smoking history or family history of AAA       Regular exercise       Healthy diet/nutrition      Appropriate preventive services were discussed with this patient, including applicable screening as appropriate for fall prevention, nutrition, physical activity, Tobacco-use cessation, weight loss and cognition.  Checklist reviewing preventive services " available has been given to the patient.    Kia Wilkes is a 65 year old, presenting for the following health issues:  Diabetes and Physical        6/19/2025     6:56 AM   Additional Questions   Roomed by Jenny SAL     History of Present Illness       Diabetes:   He presents for follow up of diabetes.  He is checking home blood glucose a few times a month.   He checks blood glucose before meals.  Blood glucose is sometimes over 200 and never under 70. He is aware of hypoglycemia symptoms including lethargy.    He has no concerns regarding his diabetes at this time.  He is having numbness in feet.  The patient has not had a diabetic eye exam in the last 12 months.          He eats 2-3 servings of fruits and vegetables daily.He consumes 0 sweetened beverage(s) daily.He exercises with enough effort to increase his heart rate 9 or less minutes per day.  He exercises with enough effort to increase his heart rate 3 or less days per week.   He is taking medications regularly.        He has not been checking his glucose lately and his diet has been worse since he retired. He is trying to eat better the past few weeks and is staying active fishing and working in his shop.    The left foot callus has returned as he has not been paring it down. It is occasionally painful when walking.     He has chronic left ear drainage and irritation and states he had a previous mastoidectomy surgery years ago. He is supposed to follow with ENT every few years but has not been seen since 2016.    Annual Wellness Visit     Patient has been advised of split billing requirements and indicates understanding: Yes     Health Care Directive  Patient does not have a Health Care Directive: Discussed advance care planning with patient; information given to patient to review.  In general, how would you rate your overall physical health? good  Do you have a special diet?  Diabetic        6/19/2025   Exercise, Social Connection, Stress   Days per  week of moderate/strenous exercise 0 days   Average minutes spent exercising at this level 0 min   Frequency of gathering with friends or relatives Pt Declined   Feel stress (tense, anxious, or unable to sleep) Not at all     Do you see a dentist two times every year?  Yes  Have you been more tired than usual lately?  No  If you drink alcohol do you typically have >3 drinks per day or >7 drinks per week? No  Do you have a current opioid prescription? No  Do you use any other controlled substances or medications that are not prescribed by a provider? None  Social History     Tobacco Use    Smoking status: Never    Smokeless tobacco: Never   Vaping Use    Vaping status: Never Used   Substance Use Topics    Alcohol use: No    Drug use: No       Needs assistance for the following daily activities: no assistance needed  Which of the following safety concerns are present in your home?  (!) NO GRAB BARS IN THE BATHROOM and (!) POOR LIGHTING   Do you (or your family members) have any concerns about your safety while driving?  No  Do you have any of the following hearing concerns?: No hearing concerns  In the past 6 months, have you been bothered by leaking of urine? No        12/28/2023   Social Factors   Worry food won't last until get money to buy more No   Food not last or not have enough money for food? No   Do you have housing? (Housing is defined as stable permanent housing and does not include staying outside in a car, in a tent, in an abandoned building, in an overnight shelter, or couch-surfing.) Yes   Are you worried about losing your housing? No   Lack of transportation? No   Unable to get utilities (heat,electricity)? No          6/19/2025   Fall Risk   Fallen 2 or more times in the past year? No   Trouble with walking or balance? No          Today's PHQ-2 Score:       6/19/2025     6:50 AM   PHQ-2 ( 1999 [employer])   Q1: Little interest or pleasure in doing things 0   Q2: Feeling down, depressed or hopeless 0    PHQ-2 Score 0    Q1: Little interest or pleasure in doing things Not at all   Q2: Feeling down, depressed or hopeless Not at all   PHQ-2 Score 0       Patient-reported       Last PSA:   PSA   Date Value Ref Range Status   05/23/2016 0.52 0 - 4 ug/L Final     Prostate Specific Antigen Screen   Date Value Ref Range Status   12/19/2024 0.49 0.00 - 4.50 ng/mL Final   08/18/2021 0.52 0.00 - 4.00 ug/L Final     ASCVD Risk   The ASCVD Risk score (Fly RENDON, et al., 2019) failed to calculate for the following reasons:    The valid total cholesterol range is 130 to 320 mg/dL    Reviewed and updated as needed this visit by Provider   Tobacco  Allergies  Meds  Problems  Med Hx  Surg Hx  Fam Hx            Current providers sharing in care for this patient include:  Patient Care Team:  Toy Martinez PA-C as PCP - General (Physician Assistant)  Toy Martinez PA-C as Assigned PCP    The following health maintenance items are reviewed in Epic and correct as of today:  Health Maintenance   Topic Date Due    HIV SCREENING  Never done    RSV VACCINE (1 - Risk 60-74 years 1-dose series) Never done    COLORECTAL CANCER SCREENING  03/11/2022    COVID-19 VACCINE (1 - 2024-25 season) Never done    EYE EXAM  03/21/2025    LIPID  06/27/2025    MICROALBUMIN  06/27/2025    A1C  12/19/2025    BMP  12/19/2025    DTAP/TDAP/TD VACCINE (2 - Td or Tdap) 05/23/2026    MEDICARE ANNUAL WELLNESS VISIT  06/19/2026    DIABETIC FOOT EXAM  06/19/2026    ANNUAL REVIEW OF HM ORDERS  06/19/2026    FALL RISK ASSESSMENT  06/19/2026    ADVANCE CARE PLANNING  06/19/2030    HEPATITIS C SCREENING  Completed    PHQ-2 (once per calendar year)  Completed    INFLUENZA VACCINE  Completed    PNEUMOCOCCAL VACCINE 50+ YEARS  Completed    ZOSTER VACCINE  Completed    HPV VACCINE  Aged Out    MENINGITIS VACCINE  Aged Out           6/19/2025   Fall Risk   Fallen 2 or more times in the past year? No   Trouble with walking or balance? No  "    Gait and balance assessed per Gait Speed Test.  Result as above.         6/19/2025   Mini Cog   Clock Draw Score 2 Normal   3 Item Recall 3 objects recalled   Mini Cog Total Score 5     Vision Screen  Patient wears corrective lenses (select all that apply): (!) NOT worn during vision screen  Vision Screen Results: (!) REFER      Patient did not know that he was going to be doing a vision test and did not have his glasses with him.       Review of Systems  Constitutional, HEENT, cardiovascular, pulmonary, GI, , musculoskeletal, neuro, skin, endocrine and psych systems are negative, except as otherwise noted.      Objective    /82   Pulse 58   Temp 97.2  F (36.2  C) (Temporal)   Resp 20   Ht 1.753 m (5' 9.02\")   Wt 121.6 kg (268 lb)   SpO2 97%   BMI 39.56 kg/m    Body mass index is 39.56 kg/m .  Physical Exam   GENERAL: healthy, alert and no distress  EYES: Eyes grossly normal to inspection, PERRL and conjunctivae and sclerae normal  HENT: Left ear canal is erythematous and edematous with mucoid drainage. Unable to visualize TM. There is a skin colored protrusion/mass in the proximal canal. Right canal and TM normal, nose and mouth without ulcers or lesions  NECK: no adenopathy, no asymmetry, masses, or scars and thyroid normal to palpation  RESP: lungs clear to auscultation - no rales, rhonchi or wheezes  CV: regular rate and rhythm, normal S1 S2, no S3 or S4, no murmur, click or rub, no peripheral edema and peripheral pulses strong  ABDOMEN: soft, nontender, no hepatosplenomegaly, no masses and bowel sounds normal  MS: no gross musculoskeletal defects noted, no edema. FROM to all extremities. No spinal tenderness.   SKIN: no suspicious lesions or rashes  NEURO: Normal strength and tone, mentation intact and speech normal. Cranial nerves II-XII are grossly intact. DTRs are 2+/4 throughout and symmetric. Gait is stable. .  PSYCH: mentation appears normal, affect normal/bright  Diabetic foot exam: " normal DP and PT pulses, no trophic changes but there is a thick callus over the left plantar first MTP area with a more focused area of even thicker callus, normal sensory exam, and normal monofilament exam, except for absent monofilament sensation over areas 1-2, 4-5 on the left and 1-2, 5-7 on the right.           Lab Results   Component Value Date    A1C 8.7 06/19/2025    A1C 7.2 12/19/2024    A1C 6.4 06/27/2024    A1C 6.6 12/28/2023    A1C 7.7 09/28/2023    A1C 8.5 05/19/2021    A1C 8.3 03/11/2021    A1C 8.1 09/23/2020    A1C 7.4 03/04/2020    A1C 9.2 11/22/2019             Signed Electronically by: Toy Martinez PA-C

## 2025-06-19 NOTE — PATIENT INSTRUCTIONS
Patient Education     Schedule an eye exam.    Consider a GLP-1 medication like Mounjaro or Ozempic.    Work on a healthier diet.    Will prescribe Augmentin and Oflloxacin for the ear.  Will refer to ENT.    Follow-up in 3 months.      Preventive Care Advice   This is general advice given by our system to help you stay healthy. However, your care team may have specific advice just for you. Please talk to your care team about your preventive care needs.  Nutrition  Eat 5 or more servings of fruits and vegetables each day.  Try wheat bread, brown rice and whole grain pasta (instead of white bread, rice, and pasta).  Get enough calcium and vitamin D. Check the label on foods and aim for 100% of the RDA (recommended daily allowance).  Lifestyle  Exercise at least 150 minutes each week  (30 minutes a day, 5 days a week).  Do muscle strengthening activities 2 days a week. These help control your weight and prevent disease.  No smoking.  Wear sunscreen to prevent skin cancer.  Have a dental exam and cleaning every 6 months.  Yearly exams  See your health care team every year to talk about:  Any changes in your health.  Any medicines your care team has prescribed.  Preventive care, family planning, and ways to prevent chronic diseases.  Shots (vaccines)   HPV shots (up to age 26), if you've never had them before.  Hepatitis B shots (up to age 59), if you've never had them before.  COVID-19 shot: Get this shot when it's due.  Flu shot: Get a flu shot every year.  Tetanus shot: Get a tetanus shot every 10 years.  Pneumococcal, hepatitis A, and RSV shots: Ask your care team if you need these based on your risk.  Shingles shot (for age 50 and up)  General health tests  Diabetes screening:  Starting at age 35, Get screened for diabetes at least every 3 years.  If you are younger than age 35, ask your care team if you should be screened for diabetes.  Cholesterol test: At age 39, start having a cholesterol test every 5 years, or  more often if advised.  Bone density scan (DEXA): At age 50, ask your care team if you should have this scan for osteoporosis (brittle bones).  Hepatitis C: Get tested at least once in your life.  STIs (sexually transmitted infections)  Before age 24: Ask your care team if you should be screened for STIs.  After age 24: Get screened for STIs if you're at risk. You are at risk for STIs (including HIV) if:  You are sexually active with more than one person.  You don't use condoms every time.  You or a partner was diagnosed with a sexually transmitted infection.  If you are at risk for HIV, ask about PrEP medicine to prevent HIV.  Get tested for HIV at least once in your life, whether you are at risk for HIV or not.  Cancer screening tests  Cervical cancer screening: If you have a cervix, begin getting regular cervical cancer screening tests starting at age 21.  Breast cancer scan (mammogram): If you've ever had breasts, begin having regular mammograms starting at age 40. This is a scan to check for breast cancer.  Colon cancer screening: It is important to start screening for colon cancer at age 45.  Have a colonoscopy test every 10 years (or more often if you're at risk) Or, ask your provider about stool tests like a FIT test every year or Cologuard test every 3 years.  To learn more about your testing options, visit:   .  For help making a decision, visit:   https://bit.ly/xy73989.  Prostate cancer screening test: If you have a prostate, ask your care team if a prostate cancer screening test (PSA) at age 55 is right for you.  Lung cancer screening: If you are a current or former smoker ages 50 to 80, ask your care team if ongoing lung cancer screenings are right for you.  For informational purposes only. Not to replace the advice of your health care provider. Copyright   2023 [employer]. All rights reserved. Clinically reviewed by the [facility]. [employer] [id number] - REV  01/24.

## 2025-06-23 ENCOUNTER — PATIENT OUTREACH (OUTPATIENT)
Dept: CARE COORDINATION | Facility: CLINIC | Age: 65
End: 2025-06-23
Payer: COMMERCIAL

## 2025-06-24 ENCOUNTER — APPOINTMENT (OUTPATIENT)
Dept: CT IMAGING | Facility: CLINIC | Age: 65
End: 2025-06-24
Attending: FAMILY MEDICINE
Payer: COMMERCIAL

## 2025-06-24 ENCOUNTER — HOSPITAL ENCOUNTER (EMERGENCY)
Facility: CLINIC | Age: 65
Discharge: HOME | End: 2025-06-24
Attending: FAMILY MEDICINE | Admitting: FAMILY MEDICINE
Payer: COMMERCIAL

## 2025-06-24 VITALS
SYSTOLIC BLOOD PRESSURE: 143 MMHG | DIASTOLIC BLOOD PRESSURE: 76 MMHG | TEMPERATURE: 97.7 F | BODY MASS INDEX: 39.25 KG/M2 | HEIGHT: 69 IN | HEART RATE: 67 BPM | WEIGHT: 265 LBS | OXYGEN SATURATION: 100 % | RESPIRATION RATE: 15 BRPM

## 2025-06-24 DIAGNOSIS — S09.90XA CLOSED HEAD INJURY, INITIAL ENCOUNTER: ICD-10-CM

## 2025-06-24 PROCEDURE — 99283 EMERGENCY DEPT VISIT LOW MDM: CPT | Performed by: FAMILY MEDICINE

## 2025-06-24 PROCEDURE — 250N000013 HC RX MED GY IP 250 OP 250 PS 637: Performed by: FAMILY MEDICINE

## 2025-06-24 PROCEDURE — 70450 CT HEAD/BRAIN W/O DYE: CPT

## 2025-06-24 PROCEDURE — 250N000011 HC RX IP 250 OP 636: Performed by: FAMILY MEDICINE

## 2025-06-24 PROCEDURE — 72125 CT NECK SPINE W/O DYE: CPT

## 2025-06-24 PROCEDURE — 99284 EMERGENCY DEPT VISIT MOD MDM: CPT | Mod: 25

## 2025-06-24 RX ORDER — MECLIZINE HYDROCHLORIDE 25 MG/1
25 TABLET ORAL ONCE
Status: COMPLETED | OUTPATIENT
Start: 2025-06-24 | End: 2025-06-24

## 2025-06-24 RX ORDER — ONDANSETRON 4 MG/1
4 TABLET, ORALLY DISINTEGRATING ORAL EVERY 8 HOURS PRN
Qty: 10 TABLET | Refills: 0 | Status: SHIPPED | OUTPATIENT
Start: 2025-06-24

## 2025-06-24 RX ORDER — ONDANSETRON 4 MG/1
4 TABLET, ORALLY DISINTEGRATING ORAL ONCE
Status: COMPLETED | OUTPATIENT
Start: 2025-06-24 | End: 2025-06-24

## 2025-06-24 RX ADMIN — ONDANSETRON 4 MG: 4 TABLET, ORALLY DISINTEGRATING ORAL at 10:07

## 2025-06-24 RX ADMIN — MECLIZINE HYDROCHLORIDE 25 MG: 25 TABLET ORAL at 10:06

## 2025-06-24 ASSESSMENT — ACTIVITIES OF DAILY LIVING (ADL)
ADLS_ACUITY_SCORE: 41
ADLS_ACUITY_SCORE: 41

## 2025-06-24 ASSESSMENT — COLUMBIA-SUICIDE SEVERITY RATING SCALE - C-SSRS
2. HAVE YOU ACTUALLY HAD ANY THOUGHTS OF KILLING YOURSELF IN THE PAST MONTH?: NO
6. HAVE YOU EVER DONE ANYTHING, STARTED TO DO ANYTHING, OR PREPARED TO DO ANYTHING TO END YOUR LIFE?: NO
1. IN THE PAST MONTH, HAVE YOU WISHED YOU WERE DEAD OR WISHED YOU COULD GO TO SLEEP AND NOT WAKE UP?: NO

## 2025-06-24 NOTE — ED PROVIDER NOTES
History     Chief Complaint   Patient presents with    Fall     HPI  Chung Lincoln is a 65 year old male who presents after a fall yesterday.  Patient missed the step and fell backwards and hit his head.  Patient does not think he blacked out or passed out.  Patient denies any neck pain.  Patient was feeling nauseous and got concerned when he got up this morning he felt really dizzy for a while, that has settled a bit.  Denies any extremity numbness or tingling.  Denies any chest pain or palpitations.    Allergies:  Allergies   Allergen Reactions    Cephalexin Monohydrate Hives and Rash       Problem List:    Patient Active Problem List    Diagnosis Date Noted    Chronic midline low back pain with bilateral sciatica 03/29/2023     Priority: Medium    Hyperlipidemia LDL goal <100 11/08/2017     Priority: Medium    STEVEN (obstructive sleep apnea) 04/14/2017     Priority: Medium    Type 2 diabetes mellitus with diabetic neuropathy (H) 09/12/2016     Priority: Medium    Essential hypertension with goal blood pressure less than 130/80 07/11/2016     Priority: Medium    CARDIOVASCULAR SCREENING; LDL GOAL LESS THAN 160 10/31/2010     Priority: Medium    Esophageal reflux 03/13/2001     Priority: Medium        Past Medical History:    Past Medical History:   Diagnosis Date    Acute pancreatitis 01/01/1997    Diabetes (H) 1/1/16    Esophageal reflux     Hypertension 12/1/20    Morbid obesity (H)     STEVEN (obstructive sleep apnea)     Pure hyperglyceridemia     Unspecified mastoiditis        Past Surgical History:    Past Surgical History:   Procedure Laterality Date    CL AFF A.M.A. LIPID PANEL  2000    elevated TG, normal HDL and LDL    ENT SURGERY Left     mastoidectomy? as a teen     ERCP W REMOVAL OF STONE, BILIARY/PANCREATIC DUCT  1997     LAPAROSCOPY, SURGICAL; CHOLECYSTECTOMY  1997    Cholecystectomy, Laparoscopic    ZZC NONSPECIFIC PROCEDURE      mastoidectomy       Family History:    Family History   Problem  "Relation Age of Onset    Cancer Mother         lung ca, smoker    Obesity Mother     Other Cancer Mother             Gastrointestinal Disease Father         GERD, esophageal stricture s/p dilation/Colitis    Diabetes Father             Hypertension Father     Cardiovascular Father     Obesity Father     Gastrointestinal Disease Sister         GERD    Diabetes Sister     Obesity Sister     Diabetes Brother             Obesity Brother     Cancer - colorectal Paternal Uncle          age 65    Obesity Brother     Obesity Sister     Prostate Cancer Other     Respiratory No family hx of        Social History:  Marital Status:  Single [1]  Social History     Tobacco Use    Smoking status: Never    Smokeless tobacco: Never   Vaping Use    Vaping status: Never Used   Substance Use Topics    Alcohol use: No    Drug use: No        Medications:    amoxicillin-clavulanate (AUGMENTIN) 875-125 MG tablet  aspirin 81 MG tablet  blood glucose (ACCU-CHEK SAMANTHA PLUS) test strip  blood glucose (NO BRAND SPECIFIED) lancets standard  blood glucose monitoring (NO BRAND SPECIFIED) meter device kit  Continuous Glucose  (FREESTYLE CRISTO 3 READER) GORDON  Continuous Glucose Sensor (FREESTYLE CRISTO 3 PLUS SENSOR) MISC  empagliflozin (JARDIANCE) 25 MG TABS tablet  lisinopril (ZESTRIL) 40 MG tablet  metFORMIN (GLUCOPHAGE) 1000 MG tablet  ofloxacin (FLOXIN) 0.3 % otic solution  Omeprazole Magnesium (PRILOSEC OTC PO)  ORDER FOR DME  simvastatin (ZOCOR) 10 MG tablet          Review of Systems   All other systems reviewed and are negative.      Physical Exam   BP: (!) 183/93  Pulse: 77  Temp: 97.7  F (36.5  C)  Resp: 15  Height: 175.3 cm (5' 9\")  Weight: 120.2 kg (265 lb)  SpO2: 100 %      Physical Exam  Vitals and nursing note reviewed.   Constitutional:       General: He is not in acute distress.     Appearance: He is well-developed. He is not diaphoretic.   HENT:      Head: Normocephalic and atraumatic.      Nose: " Nose normal.      Mouth/Throat:      Pharynx: No oropharyngeal exudate.   Eyes:      General: No scleral icterus.     Conjunctiva/sclera: Conjunctivae normal.      Pupils: Pupils are equal, round, and reactive to light.   Cardiovascular:      Rate and Rhythm: Normal rate and regular rhythm.      Heart sounds: Normal heart sounds. No murmur heard.     No friction rub.   Pulmonary:      Effort: Pulmonary effort is normal. No respiratory distress.      Breath sounds: Normal breath sounds. No wheezing or rales.   Abdominal:      General: Bowel sounds are normal. There is no distension.      Palpations: Abdomen is soft. There is no mass.      Tenderness: There is no abdominal tenderness. There is no guarding or rebound.   Musculoskeletal:         General: No tenderness. Normal range of motion.      Cervical back: Normal range of motion.   Skin:     General: Skin is warm.      Findings: No rash.   Neurological:      Mental Status: He is alert and oriented to person, place, and time.   Psychiatric:         Judgment: Judgment normal.         ED Course        Procedures           Results for orders placed or performed during the hospital encounter of 06/24/25 (from the past 24 hours)   Head CT w/o contrast    Narrative    EXAM: CT HEAD W/O CONTRAST  LOCATION: [facility]  DATE: 6/24/2025    INDICATION: fall, head injury  COMPARISON: None.  TECHNIQUE: Routine CT Head without IV contrast. Multiplanar reformats. Dose reduction techniques were used.    FINDINGS:  INTRACRANIAL CONTENTS: Areas of subtle asymmetric hyperdense thickening along the falx is favored to represent areas of mineralization. No intracranial hemorrhage or mass effect. No loss of gray-white matter differentiation. Preserved parenchymal   attenuation for patient age. Preserved ventricles and sulci for patient age.     VISUALIZED ORBITS/SINUSES/MASTOIDS: No intraorbital abnormality. No paranasal sinus mucosal disease. [Canal wall  down mastoidectomy. No middle ear or mastoid effusion.    BONES/SOFT TISSUES: No scalp hematoma. No skull fracture.      Impression    IMPRESSION: Negative for acute intracranial hemorrhage or skull fracture.   CT Cervical Spine w/o Contrast    Narrative    EXAM: CT CERVICAL SPINE W/O CONTRAST  LOCATION: [facility]  DATE: 6/24/2025    INDICATION: Fall, head injury.  COMPARISON: None.  TECHNIQUE: Routine CT Cervical Spine without IV contrast. Multiplanar reformats. Dose reduction techniques were used.    FINDINGS:  VERTEBRA: Straightening of the normal cervical lordosis which may be positional. No acute lucent fracture lines visualized. No significant loss of vertebral body height. Solid fusion across the C2-C3 disc space. Solid fusion of the right C2 and C3   facets. Marked degenerative endplate changes and loss of disc height in the cervical spine particularly at C6-C7. Right greater than left facet hypertrophy in the upper cervical spine.    CANAL/FORAMINA: No high-grade spinal canal narrowing. Several levels of neural foraminal narrowing particularly on the right at C3-C4 and on the left at C5-C6.    PARASPINAL: Sequela of right canal wall down mastoidectomy. Nonspecific soft tissue in the mastoidectomy bed.      Impression    IMPRESSION:  1.  No acute fracture appreciated in the cervical spine.  2.  Degenerative changes in the cervical spine as detailed above.       Medications   ondansetron (ZOFRAN ODT) ODT tab 4 mg (4 mg Oral $Given 6/24/25 1007)   meclizine (ANTIVERT) tablet 25 mg (25 mg Oral $Given 6/24/25 1006)     CT scan of the head and neck were unremarkable.  I think the patient's dizziness is related to his ongoing inner ear issue.  He has been referred to ear nose and throat about this as whenever he has had fluid drained from his ear he does get the same dizziness he got this morning.  Certainly patient also could have sustained a concussion from the fall but otherwise  I think it is safe to go home.  Will send the patient home with a few pills of Zofran for nausea.  Patient will follow-up if things or not improving over the next few days.    Assessments & Plan (with Medical Decision Making)  Closed head injury     I have reviewed the nursing notes.    I have reviewed the findings, diagnosis, plan and need for follow up with the patient.        New Prescriptions    No medications on file       Final diagnoses:   Closed head injury, initial encounter       6/24/2025   [facility] EMERGENCY DEPT       Hardy Kulkarni MD  06/24/25 [id number]

## 2025-06-24 NOTE — ED TRIAGE NOTES
Fell getting down from ladder yesterday morning he thought he was on the ground and missed a step on the ladder onto concrete.  Has a hematoma to back of head and small laceration to left front of head that he thinks he may have hit on his table saw.  Denies LOC, takes a baby aspirin daily.  Was feeling fine throughout day and when he went to bed.  Woke up about 0700 feeling nauseated and dizzy, mild headache. Trauma eval called at triage

## 2025-07-21 ENCOUNTER — MYC MEDICAL ADVICE (OUTPATIENT)
Dept: FAMILY MEDICINE | Facility: OTHER | Age: 65
End: 2025-07-21
Payer: COMMERCIAL

## 2025-07-21 DIAGNOSIS — R06.83 SNORING: Primary | ICD-10-CM

## 2025-07-23 ENCOUNTER — PATIENT OUTREACH (OUTPATIENT)
Dept: CARE COORDINATION | Facility: CLINIC | Age: 65
End: 2025-07-23
Payer: COMMERCIAL

## 2025-08-21 ENCOUNTER — TELEPHONE (OUTPATIENT)
Dept: FAMILY MEDICINE | Facility: OTHER | Age: 65
End: 2025-08-21
Payer: COMMERCIAL